# Patient Record
Sex: FEMALE | Race: BLACK OR AFRICAN AMERICAN | NOT HISPANIC OR LATINO | Employment: UNEMPLOYED | URBAN - METROPOLITAN AREA
[De-identification: names, ages, dates, MRNs, and addresses within clinical notes are randomized per-mention and may not be internally consistent; named-entity substitution may affect disease eponyms.]

---

## 2021-12-08 LAB — POC BETA-HCG (QUAL): NEGATIVE

## 2021-12-30 ENCOUNTER — HISTORICAL (OUTPATIENT)
Dept: INFUSION THERAPY | Facility: HOSPITAL | Age: 31
End: 2021-12-30

## 2022-02-24 ENCOUNTER — HISTORICAL (OUTPATIENT)
Dept: INFUSION THERAPY | Facility: HOSPITAL | Age: 32
End: 2022-02-24

## 2022-06-15 DIAGNOSIS — Z23 NEED FOR HPV VACCINATION: Primary | ICD-10-CM

## 2022-06-16 ENCOUNTER — INFUSION (OUTPATIENT)
Dept: INFUSION THERAPY | Facility: HOSPITAL | Age: 32
End: 2022-06-16
Attending: OBSTETRICS & GYNECOLOGY
Payer: MEDICAID

## 2022-06-16 VITALS
BODY MASS INDEX: 21.97 KG/M2 | OXYGEN SATURATION: 98 % | RESPIRATION RATE: 16 BRPM | SYSTOLIC BLOOD PRESSURE: 105 MMHG | DIASTOLIC BLOOD PRESSURE: 62 MMHG | HEART RATE: 80 BPM | TEMPERATURE: 98 F | WEIGHT: 124 LBS | HEIGHT: 63 IN

## 2022-06-16 DIAGNOSIS — Z23 NEED FOR HPV VACCINATION: ICD-10-CM

## 2022-06-16 DIAGNOSIS — Z23 NEED FOR HPV VACCINATION: Primary | ICD-10-CM

## 2022-06-16 PROCEDURE — 90471 IMMUNIZATION ADMIN: CPT | Performed by: OBSTETRICS & GYNECOLOGY

## 2022-06-16 PROCEDURE — 63600175 PHARM REV CODE 636 W HCPCS: Performed by: OBSTETRICS & GYNECOLOGY

## 2022-06-16 PROCEDURE — 96372 THER/PROPH/DIAG INJ SC/IM: CPT

## 2022-06-16 PROCEDURE — 90651 9VHPV VACCINE 2/3 DOSE IM: CPT | Performed by: OBSTETRICS & GYNECOLOGY

## 2022-06-16 RX ADMIN — HUMAN PAPILLOMAVIRUS 9-VALENT VACCINE, RECOMBINANT 0.5 ML: 30; 40; 60; 40; 20; 20; 20; 20; 20 INJECTION, SUSPENSION INTRAMUSCULAR at 09:06

## 2022-07-14 ENCOUNTER — HOSPITAL ENCOUNTER (EMERGENCY)
Facility: HOSPITAL | Age: 32
Discharge: HOME | End: 2022-07-14
Attending: EMERGENCY MEDICINE
Payer: MEDICAID

## 2022-07-14 VITALS
DIASTOLIC BLOOD PRESSURE: 85 MMHG | OXYGEN SATURATION: 99 % | SYSTOLIC BLOOD PRESSURE: 133 MMHG | RESPIRATION RATE: 16 BRPM | HEART RATE: 100 BPM

## 2022-07-14 DIAGNOSIS — G44.209 ACUTE NON INTRACTABLE TENSION-TYPE HEADACHE: Primary | ICD-10-CM

## 2022-07-14 DIAGNOSIS — T17.308A CHOKING: ICD-10-CM

## 2022-07-14 PROCEDURE — 99283 EMERGENCY DEPT VISIT LOW MDM: CPT | Mod: 25

## 2022-07-14 PROCEDURE — 25000003 PHARM REV CODE 250: Performed by: EMERGENCY MEDICINE

## 2022-07-14 RX ORDER — ACETAMINOPHEN 500 MG
1000 TABLET ORAL
Status: COMPLETED | OUTPATIENT
Start: 2022-07-14 | End: 2022-07-14

## 2022-07-14 RX ADMIN — ACETAMINOPHEN 1000 MG: 500 TABLET, FILM COATED ORAL at 07:07
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
## 2022-09-17 ENCOUNTER — HISTORICAL (OUTPATIENT)
Dept: ADMINISTRATIVE | Facility: HOSPITAL | Age: 32
End: 2022-09-17
Payer: MEDICAID

## 2022-11-18 ENCOUNTER — HOSPITAL ENCOUNTER (EMERGENCY)
Facility: HOSPITAL | Age: 32
Discharge: HOME | End: 2022-11-19
Attending: FAMILY MEDICINE
Payer: MEDICAID

## 2022-11-18 DIAGNOSIS — F41.9 ANXIETY: Primary | ICD-10-CM

## 2022-11-18 PROCEDURE — 25000003 PHARM REV CODE 250: Performed by: FAMILY MEDICINE

## 2022-11-18 PROCEDURE — 99283 EMERGENCY DEPT VISIT LOW MDM: CPT

## 2022-11-18 RX ORDER — HYDROXYZINE PAMOATE 25 MG/1
25 CAPSULE ORAL 4 TIMES DAILY
Qty: 12 CAPSULE | Refills: 0 | Status: SHIPPED | OUTPATIENT
Start: 2022-11-18 | End: 2022-11-21

## 2022-11-18 RX ORDER — HYDROXYZINE PAMOATE 25 MG/1
25 CAPSULE ORAL
Status: COMPLETED | OUTPATIENT
Start: 2022-11-18 | End: 2022-11-18

## 2022-11-18 RX ADMIN — HYDROXYZINE PAMOATE 25 MG: 25 CAPSULE ORAL at 11:11

## 2022-11-19 VITALS
DIASTOLIC BLOOD PRESSURE: 82 MMHG | HEART RATE: 106 BPM | TEMPERATURE: 97 F | RESPIRATION RATE: 20 BRPM | BODY MASS INDEX: 22.32 KG/M2 | HEIGHT: 63 IN | OXYGEN SATURATION: 100 % | WEIGHT: 126 LBS | SYSTOLIC BLOOD PRESSURE: 124 MMHG
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
## 2023-01-17 ENCOUNTER — LAB VISIT (OUTPATIENT)
Dept: LAB | Facility: HOSPITAL | Age: 33
End: 2023-01-17
Attending: OBSTETRICS & GYNECOLOGY
Payer: MEDICAID

## 2023-01-17 DIAGNOSIS — Z71.1 CONCERN ABOUT STD IN FEMALE WITHOUT DIAGNOSIS: ICD-10-CM

## 2023-01-17 PROCEDURE — 36415 COLL VENOUS BLD VENIPUNCTURE: CPT

## 2023-01-17 PROCEDURE — 80074 ACUTE HEPATITIS PANEL: CPT

## 2023-01-17 PROCEDURE — 86592 SYPHILIS TEST NON-TREP QUAL: CPT

## 2023-01-17 PROCEDURE — 87389 HIV-1 AG W/HIV-1&-2 AB AG IA: CPT

## 2023-01-17 PROCEDURE — 86780 TREPONEMA PALLIDUM: CPT

## 2023-01-18 LAB
HAV IGM SERPL QL IA: NONREACTIVE
HBV CORE IGM SERPL QL IA: NONREACTIVE
HBV SURFACE AG SERPL QL IA: NONREACTIVE
HCV AB SERPL QL IA: NONREACTIVE
HIV 1+2 AB+HIV1 P24 AG SERPL QL IA: NONREACTIVE
RPR SER QL: REACTIVE
RPR SER-TITR: ABNORMAL {TITER}
T PALLIDUM AB SER QL: REACTIVE

## 2023-01-20 LAB — PATH REV: NORMAL

## 2023-04-13 ENCOUNTER — HOSPITAL ENCOUNTER (EMERGENCY)
Facility: HOSPITAL | Age: 33
Discharge: HOME | End: 2023-04-14
Attending: STUDENT IN AN ORGANIZED HEALTH CARE EDUCATION/TRAINING PROGRAM
Payer: MEDICAID

## 2023-04-13 DIAGNOSIS — R11.0 NAUSEA: ICD-10-CM

## 2023-04-13 DIAGNOSIS — R10.30 LOWER ABDOMINAL PAIN: Primary | ICD-10-CM

## 2023-04-13 LAB
APPEARANCE UR: CLEAR
B-HCG SERPL QL: NEGATIVE
BACTERIA #/AREA URNS AUTO: ABNORMAL /HPF
BASOPHILS # BLD AUTO: 0.07 X10(3)/MCL (ref 0–0.2)
BASOPHILS NFR BLD AUTO: 1 %
BILIRUB UR QL STRIP.AUTO: NEGATIVE MG/DL
COLOR UR AUTO: YELLOW
EOSINOPHIL # BLD AUTO: 0.06 X10(3)/MCL (ref 0–0.9)
EOSINOPHIL NFR BLD AUTO: 0.9 %
ERYTHROCYTE [DISTWIDTH] IN BLOOD BY AUTOMATED COUNT: 12.8 % (ref 11.5–17)
GLUCOSE UR QL STRIP.AUTO: NEGATIVE MG/DL
HCT VFR BLD AUTO: 37.5 % (ref 37–47)
HGB BLD-MCNC: 12.5 G/DL (ref 12–16)
IMM GRANULOCYTES # BLD AUTO: 0.01 X10(3)/MCL (ref 0–0.04)
IMM GRANULOCYTES NFR BLD AUTO: 0.1 %
KETONES UR QL STRIP.AUTO: NEGATIVE MG/DL
LEUKOCYTE ESTERASE UR QL STRIP.AUTO: ABNORMAL UNIT/L
LYMPHOCYTES # BLD AUTO: 2.7 X10(3)/MCL (ref 0.6–4.6)
LYMPHOCYTES NFR BLD AUTO: 38.6 %
MCH RBC QN AUTO: 29.7 PG (ref 27–31)
MCHC RBC AUTO-ENTMCNC: 33.3 G/DL (ref 33–36)
MCV RBC AUTO: 89.1 FL (ref 80–94)
MONOCYTES # BLD AUTO: 0.63 X10(3)/MCL (ref 0.1–1.3)
MONOCYTES NFR BLD AUTO: 9 %
NEUTROPHILS # BLD AUTO: 3.52 X10(3)/MCL (ref 2.1–9.2)
NEUTROPHILS NFR BLD AUTO: 50.4 %
NITRITE UR QL STRIP.AUTO: NEGATIVE
NRBC BLD AUTO-RTO: 0 %
PH UR STRIP.AUTO: 7 [PH]
PLATELET # BLD AUTO: 238 X10(3)/MCL (ref 130–400)
PMV BLD AUTO: 10.3 FL (ref 7.4–10.4)
PROT UR QL STRIP.AUTO: NEGATIVE MG/DL
RBC # BLD AUTO: 4.21 X10(6)/MCL (ref 4.2–5.4)
RBC #/AREA URNS AUTO: ABNORMAL /HPF
RBC UR QL AUTO: ABNORMAL UNIT/L
SP GR UR STRIP.AUTO: 1.01
SQUAMOUS #/AREA URNS AUTO: ABNORMAL /HPF
UROBILINOGEN UR STRIP-ACNC: 0.2 MG/DL
WBC # SPEC AUTO: 7 X10(3)/MCL (ref 4.5–11.5)
WBC #/AREA URNS AUTO: ABNORMAL /HPF

## 2023-04-13 PROCEDURE — 80053 COMPREHEN METABOLIC PANEL: CPT | Performed by: STUDENT IN AN ORGANIZED HEALTH CARE EDUCATION/TRAINING PROGRAM

## 2023-04-13 PROCEDURE — 83690 ASSAY OF LIPASE: CPT | Performed by: STUDENT IN AN ORGANIZED HEALTH CARE EDUCATION/TRAINING PROGRAM

## 2023-04-13 PROCEDURE — 81025 URINE PREGNANCY TEST: CPT | Performed by: STUDENT IN AN ORGANIZED HEALTH CARE EDUCATION/TRAINING PROGRAM

## 2023-04-13 PROCEDURE — 63600175 PHARM REV CODE 636 W HCPCS: Performed by: STUDENT IN AN ORGANIZED HEALTH CARE EDUCATION/TRAINING PROGRAM

## 2023-04-13 PROCEDURE — 85025 COMPLETE CBC W/AUTO DIFF WBC: CPT | Performed by: STUDENT IN AN ORGANIZED HEALTH CARE EDUCATION/TRAINING PROGRAM

## 2023-04-13 PROCEDURE — 81001 URINALYSIS AUTO W/SCOPE: CPT | Performed by: STUDENT IN AN ORGANIZED HEALTH CARE EDUCATION/TRAINING PROGRAM

## 2023-04-13 PROCEDURE — 83735 ASSAY OF MAGNESIUM: CPT | Performed by: STUDENT IN AN ORGANIZED HEALTH CARE EDUCATION/TRAINING PROGRAM

## 2023-04-13 PROCEDURE — 25000003 PHARM REV CODE 250: Performed by: STUDENT IN AN ORGANIZED HEALTH CARE EDUCATION/TRAINING PROGRAM

## 2023-04-13 PROCEDURE — 99284 EMERGENCY DEPT VISIT MOD MDM: CPT | Mod: 25

## 2023-04-13 PROCEDURE — 96360 HYDRATION IV INFUSION INIT: CPT

## 2023-04-13 RX ORDER — ONDANSETRON 4 MG/1
4 TABLET, ORALLY DISINTEGRATING ORAL
Status: COMPLETED | OUTPATIENT
Start: 2023-04-13 | End: 2023-04-13

## 2023-04-13 RX ADMIN — ONDANSETRON 4 MG: 4 TABLET, ORALLY DISINTEGRATING ORAL at 10:04

## 2023-04-13 RX ADMIN — SODIUM CHLORIDE, POTASSIUM CHLORIDE, SODIUM LACTATE AND CALCIUM CHLORIDE 1000 ML: 600; 310; 30; 20 INJECTION, SOLUTION INTRAVENOUS at 11:04

## 2023-04-14 VITALS
RESPIRATION RATE: 18 BRPM | WEIGHT: 129 LBS | SYSTOLIC BLOOD PRESSURE: 129 MMHG | BODY MASS INDEX: 22.86 KG/M2 | HEIGHT: 63 IN | HEART RATE: 73 BPM | TEMPERATURE: 98 F | OXYGEN SATURATION: 98 % | DIASTOLIC BLOOD PRESSURE: 78 MMHG

## 2023-04-14 LAB
ALBUMIN SERPL-MCNC: 3.5 G/DL (ref 3.5–5)
ALBUMIN/GLOB SERPL: 1.2 RATIO (ref 1.1–2)
ALP SERPL-CCNC: 71 UNIT/L (ref 40–150)
ALT SERPL-CCNC: 9 UNIT/L (ref 0–55)
AST SERPL-CCNC: 11 UNIT/L (ref 5–34)
BILIRUBIN DIRECT+TOT PNL SERPL-MCNC: 0.3 MG/DL
BUN SERPL-MCNC: 9.3 MG/DL (ref 7–18.7)
CALCIUM SERPL-MCNC: 9 MG/DL (ref 8.4–10.2)
CHLORIDE SERPL-SCNC: 109 MMOL/L (ref 98–107)
CO2 SERPL-SCNC: 24 MMOL/L (ref 22–29)
CREAT SERPL-MCNC: 0.71 MG/DL (ref 0.55–1.02)
GFR SERPLBLD CREATININE-BSD FMLA CKD-EPI: >60 MLS/MIN/1.73/M2
GLOBULIN SER-MCNC: 3 GM/DL (ref 2.4–3.5)
GLUCOSE SERPL-MCNC: 88 MG/DL (ref 74–100)
LIPASE SERPL-CCNC: 37 U/L
MAGNESIUM SERPL-MCNC: 1.9 MG/DL (ref 1.6–2.6)
POTASSIUM SERPL-SCNC: 3.8 MMOL/L (ref 3.5–5.1)
PROT SERPL-MCNC: 6.5 GM/DL (ref 6.4–8.3)
SODIUM SERPL-SCNC: 140 MMOL/L (ref 136–145)

## 2023-04-14 RX ORDER — ONDANSETRON 4 MG/1
4 TABLET, FILM COATED ORAL EVERY 6 HOURS
Qty: 12 TABLET | Refills: 0 | Status: SHIPPED | OUTPATIENT
Start: 2023-04-14 | End: 2023-05-09

## 2023-04-14 NOTE — ED PROVIDER NOTES
"Encounter Date: 4/13/2023       History     Chief Complaint   Patient presents with    Abdominal Pain     " Stomach pain left side vomited once."     HPI    33-year-old female with no known past medical history presents emergency department with left lower quadrant abdominal pain.  Patient states this started this morning.  States it fluctuates.  Had episode of vomiting earlier.  No longer having any nausea.  Denies any constipation diarrhea. no burning on urination.    Review of patient's allergies indicates:   Allergen Reactions    Ibuprofen Hives     No past medical history on file.  Past Surgical History:   Procedure Laterality Date    TUBAL LIGATION       No family history on file.  Social History     Tobacco Use    Smoking status: Never     Passive exposure: Never    Smokeless tobacco: Never     Review of Systems   Constitutional:  Negative for fever.   Respiratory:  Negative for cough and shortness of breath.    Cardiovascular:  Negative for chest pain.   Gastrointestinal:  Positive for abdominal pain, nausea and vomiting. Negative for constipation and diarrhea.   Genitourinary:  Negative for dysuria.   All other systems reviewed and are negative.    Physical Exam     Initial Vitals [04/13/23 2216]   BP Pulse Resp Temp SpO2   122/75 89 20 98.3 °F (36.8 °C) 98 %      MAP       --         Physical Exam    Nursing note and vitals reviewed.  Constitutional: She appears well-developed and well-nourished. No distress.   Cardiovascular:  Normal rate and regular rhythm.           Pulmonary/Chest: Breath sounds normal. No respiratory distress.   Abdominal: Abdomen is soft. There is abdominal tenderness (mild tenderness to the left lower quadrant). There is guarding. There is no rebound.   Musculoskeletal:         General: No tenderness. Normal range of motion.     Neurological: She is alert and oriented to person, place, and time.   Skin: Skin is warm. Capillary refill takes less than 2 seconds.   Psychiatric: She has " a normal mood and affect. Thought content normal.       ED Course   Procedures  Labs Reviewed   URINALYSIS - Abnormal; Notable for the following components:       Result Value    Blood, UA Trace (*)     Leukocyte Esterase, UA Small (*)     All other components within normal limits   COMPREHENSIVE METABOLIC PANEL - Abnormal; Notable for the following components:    Chloride 109 (*)     All other components within normal limits   URINALYSIS, MICROSCOPIC - Abnormal; Notable for the following components:    WBC, UA 6-10 (*)     Squamous Epithelial Cells, UA Few (*)     All other components within normal limits   PREGNANCY TEST, URINE RAPID - Normal   LIPASE - Normal   MAGNESIUM - Normal   CBC W/ AUTO DIFFERENTIAL    Narrative:     The following orders were created for panel order CBC auto differential.  Procedure                               Abnormality         Status                     ---------                               -----------         ------                     CBC with Differential[[id number]]                            Final result                 Please view results for these tests on the individual orders.   CBC WITH DIFFERENTIAL          Imaging Results    None          Medications   lactated ringers bolus 1,000 mL (1,000 mLs Intravenous New Bag 4/13/23 3430)   ondansetron disintegrating tablet 4 mg (4 mg Oral Given 4/13/23 0109)     Medical Decision Making:   Differential Diagnosis:   Abdominal pain, nausea vomiting, diverticulitis, ovarian cyst, UTI   Medical Decision Making  Problems Addressed:  Lower abdominal pain: undiagnosed new problem with uncertain prognosis  Nausea: self-limited or minor problem    Amount and/or Complexity of Data Reviewed  Labs: ordered. Decision-making details documented in ED Course.    Risk  OTC drugs.  Prescription drug management.  Risk Details: Offered CT the patient.  Patient denied.  This is reasonable.              ED Course as of 04/14/23 0013   Thu Apr 13, 2023    2255 Preg Test, Ur: Negative [BS]   Fri Apr 14, 2023   0009 Magnesium: 1.90 [BS]   0009 Sodium: 140 [BS]   0009 Potassium: 3.8 [BS]   0009 Chloride(!): 109 [BS]   0009 CO2: 24 [BS]   0009 Glucose: 88 [BS]   0009 Creatinine: 0.71 [BS]   0009 BUN: 9.3 [BS]   0009 Bacteria, UA: None Seen [BS]   0009 WBC: 7.0 [BS]   0009 Hematocrit: 37.5 [BS]   0009 Hemoglobin: 12.5 [BS]   0009 Platelets: 238  Patient resting comfortably bed in no acute distress vital signs stable.  Patient states she feels much better with the fluids.  Informed her that all labs are within normal limits and that we could get a CT of she is very concerned.  Patient states that since her abdominal pain is improving she just wants to be discharged.  She states that she will return if anything worsens.  This is completely reasonable and justified.  That will give her some Zofran have if she becomes nauseated patient agrees with this.  ER precautions given. [BS]      ED Course User Index  [BS] Gunnar Cunningham MD                 Clinical Impression:   Final diagnoses:  [R10.30] Lower abdominal pain (Primary)  [R11.0] Nausea        ED Disposition Condition    Discharge Stable          ED Prescriptions       Medication Sig Dispense Start Date End Date Auth. Provider    ondansetron (ZOFRAN) 4 MG tablet Take 1 tablet (4 mg total) by mouth every 6 (six) hours. 12 tablet 4/14/2023 -- Gunnar Cunningham MD          Follow-up Information       Follow up With Specialties Details Why Contact Info    [facility] - Emergency Dept Emergency Medicine Go to  If symptoms worsen [address]  [facility] [id number] [phone]             Gunnar Cunningham MD  04/14/23 0013

## 2023-04-26 ENCOUNTER — PATIENT OUTREACH (OUTPATIENT)
Dept: EMERGENCY MEDICINE | Facility: HOSPITAL | Age: 33
End: 2023-04-26
Payer: MEDICAID

## 2023-04-26 NOTE — PATIENT INSTRUCTIONS
Please give me a call if you need anything in the future.    Thank you,    Nisa, Nurse Navigator    [facility]    [phone]     Why Should I Have My Own Doctor or Nurse Practitioner (PCP) to Take Care of Me  What is a PCP (Primary Care Provider)?    A primary care provider is a doctor or nurse practitioner who you can call for an appointment and will see you when you are sick.    You will also be seen at scheduled appointment times during the year to check on your diabetes, or high blood pressure, or heart disease.    Why see the same PCP (doctor/nurse practitioner)?    You can be seen faster when you are sick           You, the PCP (doctor/nurse practitioner) and the office staff get to know each other; you begin to trust them to care for you. You take part in your health choices.   All of you together are a team.    Your medicine is looked at every time you visit, to be sure you are taking the medicine, as the PCP (doctor/nurse practitioner) ordered.    Your PCP (doctor/nurse practitioner) and their staff help keep you healthy and out of the hospital.  They can catch sicknesses earlier by ordering tests once a year to stop or prevent the sickness from getting worse.      Your PCP (doctor/nurse practitioner) can send you to providers who specialize (heart/bone/lung) if you need.  They and their office staff help keep track of your seeing other providers (doctors/nurse practitioners) and tests (CT/ MRIs/ X Rays)) taken.        PCPs want you to stay healthy.  Let us care for you.             Why is taking care of your mouth/teeth/gums important?     Your mouth is the opening to your body.  If not kept clean, it can let in sickness to the rest of your body.     Oral Health Care (Dentists)                 City:  Provider Address Phone Number Insurance Plan   [place]:  None available                    Toño Rojas, S [address] [place] [address] LENNOXToño VILLALOBOS [phone]   ADULTS ONLY Medicaid:   HB & AETNA ONLY             [place]         Dentures and Dental Service ([facility]) [address] [phone]  DENTURES ONLY ADULT Medicaid:  HB & AETNA ONLY             [facility] [address] [facility] [phone] All Medicaid/Medicare             Lea Hameed, DDS 1600  [facility] Lea Winkler [phone] Medicaid Children only 2 to 21             [facility] 104 [facility] [phone] Accepts:   [facility], HB, Aetna         [facility] [address] [facility] [facility] [address], [place] [phone] Medicare             [facility] [address], [place] [phone] Medicaid Children only 2 to 21             [facility] [address], [place] [phone] Medicaid Children only 2 to 21             [facility] [facility]  [address], [place] [phone] Medicaid Children only 5 yrs and younger:  lip/tongue tie             [facility] 13[address] [facility] [phone] Medicaid Children only 2 to 21          [facility]  [address] [facility]  [phone]           [facility] 1004 [facility] [phone] All Medicaid/Medicare :  ADULTS             [facility] 500 [facility] [phone] All Medicaid/Medicare :  ADULTS             [facility] [address]  [facility] [phone] Medicaid Children only 2 to 21             [facility]  [address] [place] [phone] Medicaid Children only 2 to 21             Dr. Jenn Umanzor,  [facility] [phone] Medicare for Dentures Only             [facility], [facility] 8762 , [place] [phone] All Medicaid/Medicare :   EXCEPT [facility]; ADULTS         Juma Perkins [address] [facility]  "[phone] Accepts:   LHC, HB, Aetna             [address] [phone]  C, [facility], HB, Aetna/Medicare :  [facility]; [place]: [phone]  [facility]; [place]: [phone]       [facility] Medicaid Eye Clinics      [facility]  [address] [place] [address]  Phone: [phone]  (Accepts all Medicaid for visit and glasses)     [facility]  [address] [place] [address]  Phone: [phone]  (Only [facility] Medicaid for visit)  (All plans for glasses)     [facility]   [address] [place] [address]  Phone: [phone]  (All Medicaid plans for visit and glasses)  (Only 1 doctor that comes twice a month)        [facility]  [address] [place] [address]  Phone: [phone]  (No Medicaid for visit)  (Aetna, [facility], [facility] Healthy Blue for glasses)    [facility]  [address] [address]  phone: [phone]  (Accepts all Medicaid)                 [facility]  [address] [place]. [address]  phone: [phone]  (Accepts [facility], [facility] and [facility] Medicaid)     [facility]  [address] [place] [address]; [address]  phone" [phone]  (Accepts all Medicaid)           Feeling lonely?    Short periods of feeling lonely can occur to many people at some time in their life.  However, when feelings of loneliness and isolation do not go away, these symptoms can place you at greater risk for more serious health problems, including:      Depression  Not sleeping      Heart Disease      High blood pressure  Drug/alcohol abuse      What can you do?          Talk to your doctor; let someone know what is going on    Try to be with people:  volunteer, get a hobby  Exercise outside with others in the sunlight  Places to call to get together with " others:  [employer]-- Help to volunteer in our community-   Phone: [phone];  [address], [place] [address]  JAIR [place] [facility] [address] [place] [place] [address] [phone]  --PACE and [facility] waiver program--for elderly who would benefit from meeting others:      [employer]--for social support for those aging out of the foster care system: [phone];  Email: connect@[facility] [place].org  [employer]--for young people with disabilities to participate in sports and other social activities ;   [address] [place] [address] [place] [address] [phone]  [facility]: [phone]  [facility]: [phone]  [place] on [phone]             How does drug/alcohol abuse affect your health?          Drug and alcohol abuse can cause your breathing to slow down enough for you to stop breathing. It can cause you to see things that are not there. Keep you from sleeping for days.  Continued use of drugs and alcohol will weaken your heart, liver, and kidneys.        279 is the three-digit nationwide phone number to connect directly to crisis lifeline.  BY calling or texting 356, you will connect with mental health professions.  Veterans can press 1 after dialing 505 to connect directly to Veterans Crisis lifeline.      Mental Health/Substance Abuse  [place] [facility]:  [phone]  [facility]: [phone]  [facility] Human Services: [phone]    [facility] Developmental Disabilities  [address] [place] [address]  Phone: [phone]  Fax:  [phone]    [facility]  [address] [place]  [address]  Phone:  [phone]  Fax:  [phone]          [facility]  [address] [place]  [address]  Phone:  [phone]  Fax:  [phone]    [facility]  [address] [place]  [address]  Phone:  [phone]  Fax:  [phone]    [facility]  [address] [place] [address]  Phone: [phone]  Fax:  [phone]    [facility]  [address] [place]  [address]  Phone:  [phone]  Fax: [phone]    [facility]  [address]        [place], [place]  Phone:  [phone]    [facility]       [address]        [place], [place]  [id number] [phone]      [facility]   [address] [place] [address]   Phone: [phone]   Medicaid Accepted Plans  Healthy Blue   [facility] on hold at present                                                      Medicaid Accepted Plans    [facility] Healthy Blue   [address] [place] [address] [facility]   Phone:[phone]     Healthy Blue   [address] [place] [address] [facility]    Phone:[phone]      [facility] Healthy Blue   [address] Aetna   [address] [place] [address] [facility]   Phone:  [phone]      [facility] Aetna   [address] [facility] [place] [address]    Phone: [phone]      [facility] All   [address] [place] [address]    Phone: [phone]        [facility] All   [address] [place] [address]    Phone: [phone]         All     [address] [place] [address]    Phone: [phone]     All   [address] [place] [address]    Phone: [phone]        [facility] All   [address] [place] [address]    Phone: [phone]        [facility] All   [address]    [place] [address]    Phone: [phone]        [facility] All   [address] [facility]   [address]    [place] [address]    Phone: [phone]        [facility] All   [address] [facility]    [place] [address]    Phone: [phone]        [address] [place] [address]    Phone: [phone]            [facility] All   [address] [facility] [place] [address]    Phone: [phone]              [employer]       Aetna   [address] Healthy Blue   [address] [place] [address]    Phone: [phone]        [address]   [place] [address] Healthy Blue   Phone: [phone] [facility]       [address] [facility]   [address] [place] [address] [facility]   Phone: [phone]        Rehab Services All   [address] [facility]    [place] [address]    Phone: [phone]        [address] [facility] [place] [address]    [phone]        [address] [facility] [place] [address]    Phone: [phone]        [facility] All   [address]    [address]    [place] [address]    Phone: [phone]    Counseling only can be self-referral        [address] [facility] [place] [address]    [phone]              [address] [facility] [place] [address]    Phone: [phone]          [facility]   [facility]   [address] [facility] Healthy Blue   [place] [address] [facility]   Phone: [phone]            [facility]     All   [address]    [address]    [place] [address]    Phone: [phone]    Grace Lundberg/Ellyn Threats      [employer] Medicaid  Substance Abuse Rehabilitation Facility,   [facility]  [facility]  [phone]             Budget-Friendly Healthy Eating    Save money at the grocery store and eat healthy.   Buy groceries keeping your budget in mind  Make a grocery list and only buy what you have on the list  Eat food  "you cook or have at home; limit fast food or eating out    Compare food labels  Look at store brand food labels and compare to brand names, often food value is the same and the store brand is cheaper      Look for products that do not have sugar, fat, or salt (sodium) added.  These often cost the same but are healthier for you.  They may be labeled as:  ?Sugar-free.  ?Nonfat.              ?Low-fat.  ?Sodium-free.  ?Low sodium.  Look for lean ground beef labeled as at least 92% lean and 8% fat.        Shopping    Buy only the items on your grocery list and go only to the areas of the store that have the items on your list.  Use coupons only for foods and brands you normally buy. Avoid buying items you wouldn't normally buy simply because they are on sale.  Check online and in newspapers for weekly deals.  Buy healthy items from the bulk bins when available, such as herbs, spices, flour, pasta, nuts, and dried fruit.  Buy fruits and vegetables that are in season. Prices are usually lower on in-season produce.  Look at the unit price on the price tag. Use it to compare different brands and sizes to find out which item is the best deal.  Choose healthy items that are often low-cost, such as carrots, potatoes, apples, bananas, and oranges. Dried or canned beans are a low-cost protein source.      Buy in bulk and freeze extra food. Items you can buy in bulk include meats, fish, poultry, frozen fruits, and frozen vegetables.  Avoid buying "ready-to-eat" foods, such as pre-cut fruits and vegetables and pre-made salads.  If possible, shop around to discover where you can find the best prices. Consider other retailers such as dollar stores, larger wholesale stores, local fruit and vegetable stands, and [employer] markets.  Do not shop when you are hungry. If you shop while hungry, it may be hard to stick to your list and budget.      Resist impulse buying. Use your grocery list as your official plan for the week.      Buy a " "variety of vegetables and fruits by purchasing fresh, frozen, and canned items.  Look at the top and bottom shelves for deals. Foods at eye level (eye level of an adult or child) are usually more expensive.  Be efficient with your time when shopping. The more time you spend at the store, the more money you are likely to spend.  To save money when choosing more expensive foods like meats and dairy:  ?Choose cheaper cuts of meat, such as bone-in chicken thighs and drumsticks instead of skinless and boneless chicken. When you are ready to prepare the chicken, you can remove the skin yourself to make it healthier.  ?Choose lean meats like chicken or turkey instead of beef.  ?Choose canned seafood, such as tuna, salmon, or sardines.  ?Buy eggs as a low-cost source of protein.  ?Buy dried beans and peas, such as lentils, split peas, or kidney beans instead of meats. Dried beans and peas are a good alternative source of protein.  ?Buy the larger tubs of yogurt instead of individual-sized containers.                        Choose water instead of sodas and other sweetened beverages.  Avoid buying chips, cookies, and other "junk food." These items are usually expensive and not healthy.  Meal planning  Do not eat out or get fast food. Prepare food at home.  Make a grocery list and make sure to bring it with you to the store.   Plan meals and snacks according to a grocery list and budget you create.  Use leftovers in your meal plan for the week.  Look for recipes where you can cook once and make enough food for two meals.  Include budget-friendly meals like stews, casseroles, and stir-milian dishes.  Try some meatless meals or try "no cook" meals like salads.  Make sure that half your plate is filled with fruits or vegetables. Choose from fresh, frozen, or canned fruits and vegetables. If eating canned, remember to rinse them before eating. This will remove any excess salt added for packaging.            Summary  Eating healthy " on a budget is possible if you plan your meals according to your budget, buy according to your budget and grocery list, and prepare food yourself.   Tips for buying more food on a limited budget include buying generic brands, using coupons only for foods you normally buy, and buying healthy items from the bulk bins when available.  Tips for buying cheaper food to replace expensive food include choosing cheaper, lean cuts of meat, and buying dried beans and peas.    Discuss any question you have with your doctor.    07/06/2022 custom

## 2023-04-26 NOTE — PROGRESS NOTES
Spoke to pt for post ed visit and initial MCIP. Pt verbal consent to enroll in MCIP and continue f/u calls. Pt reports relief from ED visit. Advised pt to follow discharge instructions. Discussed medication and budget friendly healthy eating compliance, benefits of pcp, oral/vision care, ED utilization and to utilize UCC for non emergency issues until est pcp and when pcp is not available and stressed importance of est pcp and f/u care with pcp and mental health provider. Pcp/Mental Health provider options discussed with pt. Pt request assistance with obtaining pcp appt with [facility]. Pt reports she was seeing a mental health provider in [place], but no longer seeing and does not have a mental health provider. Pt denies si/hi and mental health crisis.   Called [facility] to obtain PCP 5/9/23 at 10:30 am.   Advised pt to discuss mental health at new pcp appt.   Offered pt to mail avs with [facility] education/resource discussed, pt agreed to mail this information, verified pt address with pt.  Appointment:  5/2/23 at 9:30 am with Keven Zamudio MD (gyn)  5/9/23 at 10:30 am for new pt pcp appt with [facility]    Providers:  Keven Zamudio MD (gyn)  Bernardo Luo NP(mental health)

## 2023-05-01 ENCOUNTER — PATIENT OUTREACH (OUTPATIENT)
Dept: EMERGENCY MEDICINE | Facility: HOSPITAL | Age: 33
End: 2023-05-01
Payer: MEDICAID

## 2023-05-01 NOTE — PROGRESS NOTES
Spoke to pt and remind of appt on tomorrow 5/2/23 at 9:30 am with Dr. Keven Zamudio,gyn, stressed importance of attending appt. Pt voices understanding to instructions given and appreciation.

## 2023-05-08 ENCOUNTER — PATIENT OUTREACH (OUTPATIENT)
Dept: EMERGENCY MEDICINE | Facility: HOSPITAL | Age: 33
End: 2023-05-08
Payer: MEDICAID

## 2023-05-08 NOTE — PROGRESS NOTES
Spoke to pt for f/u. Applaud pt on attending appt with gyn on last week. Pt reports that over there weekend she was dizzy and vomit. Pt reports symptoms are better today. Encouraged pt to visit ucc/ed for worsening symptoms. Discussed medication and budget friendly health eating compliance, benefits of pcp and upcoming appts, ED utilization and to utilize UCC for non emergency issues when pcp is not available and stressed importance of est pcp and f/u care with pcp and all providers and ancillary care. Pt denies si/hi and mental health crisis and to discuss mental health condition with new pcp. Pt reports she did receive mail sent to her on [facility] education/resources. Pt voices understanding to instructions given and appreciation.   Appointment:  Nv- 7/28/23 at 9 am for depo lv-5/2/23  with Keven Zamudio MD (gyn)  5/9/23 at 10:30 am [facility]     Providers:  Keven Zamudio MD (gyn)  Bernardo Luo NP(mental health)

## 2023-05-08 NOTE — PROGRESS NOTES
Spoke to pt and remind of appt for tomorrow 5/9/23 at 10:30 am, stressed importance of attending appt to est pcp and benefits of pcp. Pt voices understanding to instructions given and appreciation.

## 2023-05-09 ENCOUNTER — OFFICE VISIT (OUTPATIENT)
Dept: FAMILY MEDICINE | Facility: CLINIC | Age: 33
End: 2023-05-09
Payer: MEDICAID

## 2023-05-09 VITALS
RESPIRATION RATE: 18 BRPM | HEIGHT: 63 IN | OXYGEN SATURATION: 98 % | BODY MASS INDEX: 23.53 KG/M2 | SYSTOLIC BLOOD PRESSURE: 111 MMHG | DIASTOLIC BLOOD PRESSURE: 74 MMHG | WEIGHT: 132.81 LBS | HEART RATE: 78 BPM | TEMPERATURE: 98 F

## 2023-05-09 DIAGNOSIS — F32.A DEPRESSION, UNSPECIFIED DEPRESSION TYPE: Primary | ICD-10-CM

## 2023-05-09 DIAGNOSIS — Z76.89 ENCOUNTER TO ESTABLISH CARE: ICD-10-CM

## 2023-05-09 PROCEDURE — 3074F PR MOST RECENT SYSTOLIC BLOOD PRESSURE < 130 MM HG: ICD-10-PCS | Mod: CPTII,,, | Performed by: NURSE PRACTITIONER

## 2023-05-09 PROCEDURE — 1159F MED LIST DOCD IN RCRD: CPT | Mod: CPTII,,, | Performed by: NURSE PRACTITIONER

## 2023-05-09 PROCEDURE — 99203 PR OFFICE/OUTPT VISIT, NEW, LEVL III, 30-44 MIN: ICD-10-PCS | Mod: ,,, | Performed by: NURSE PRACTITIONER

## 2023-05-09 PROCEDURE — 3008F PR BODY MASS INDEX (BMI) DOCUMENTED: ICD-10-PCS | Mod: CPTII,,, | Performed by: NURSE PRACTITIONER

## 2023-05-09 PROCEDURE — 3078F PR MOST RECENT DIASTOLIC BLOOD PRESSURE < 80 MM HG: ICD-10-PCS | Mod: CPTII,,, | Performed by: NURSE PRACTITIONER

## 2023-05-09 PROCEDURE — 3008F BODY MASS INDEX DOCD: CPT | Mod: CPTII,,, | Performed by: NURSE PRACTITIONER

## 2023-05-09 PROCEDURE — 99203 OFFICE O/P NEW LOW 30 MIN: CPT | Mod: ,,, | Performed by: NURSE PRACTITIONER

## 2023-05-09 PROCEDURE — 1159F PR MEDICATION LIST DOCUMENTED IN MEDICAL RECORD: ICD-10-PCS | Mod: CPTII,,, | Performed by: NURSE PRACTITIONER

## 2023-05-09 PROCEDURE — 3078F DIAST BP <80 MM HG: CPT | Mod: CPTII,,, | Performed by: NURSE PRACTITIONER

## 2023-05-09 PROCEDURE — 3074F SYST BP LT 130 MM HG: CPT | Mod: CPTII,,, | Performed by: NURSE PRACTITIONER

## 2023-05-09 RX ORDER — FLUOXETINE 10 MG/1
10 CAPSULE ORAL DAILY
Qty: 30 CAPSULE | Refills: 1 | Status: SHIPPED | OUTPATIENT
Start: 2023-05-09 | End: 2023-05-31 | Stop reason: SDUPTHER

## 2023-05-09 NOTE — PROGRESS NOTES
"SUBJECTIVE:     History of Present Illness      Chief Complaint: Establish Care (New pt . C/o headache)    HPI:  Patient is a 33 y.o. year old female who presents to clinic to establish care as a new patient.  Patient states that she is having some anxiety and causes her to have headaches.    She denies SI, HI.      Review of Systems:    Review of Systems    12 point review of systems conducted, negative except as stated in the history of present illness. See HPI for details.     Previous History      Review of patient's allergies indicates:   Allergen Reactions    Ibuprofen Hives       History reviewed. No pertinent past medical history.  Current Outpatient Medications   Medication Instructions    FLUoxetine 10 mg, Oral, Daily    medroxyPROGESTERone (DEPO-PROVERA) 150 mg, Intramuscular, Every 3 months    metroNIDAZOLE (FLAGYL) 500 MG tablet Take 4 pills at one time on day one of treatment then take 4 pills at one time on day two of treatment.     Past Surgical History:   Procedure Laterality Date    TUBAL LIGATION       Family History   Problem Relation Age of Onset    Hypertension Mother     Hypertension Father        Social History     Tobacco Use    Smoking status: Never     Passive exposure: Never    Smokeless tobacco: Never   Substance Use Topics    Alcohol use: Not Currently    Drug use: Never        Health Maintenance      Health Maintenance   Topic Date Due    Lipid Panel  Never done    TETANUS VACCINE  Never done    Hepatitis C Screening  Completed       OBJECTIVE:     Physical Exam      Vital Signs Reviewed   Visit Vitals  /74 (BP Location: Right arm)   Pulse 78   Temp 97.8 °F (36.6 °C)   Resp 18   Ht 5' 3" (1.6 m)   Wt 60.2 kg (132 lb 12.8 oz)   LMP 04/20/2023 (Approximate)   SpO2 98%   BMI 23.52 kg/m²       Physical Exam    Physical Exam:  General: Alert, well nourished, no acute distress, non-toxic appearing.   Eyes: Anicteric sclera, without conjunctival injection, normal lids, no purulent " drainage, EOMs grossly intact.   Ears: No tragal tenderness. Tympanic membranes intact, pearly grey, without effusion or erythema and with a positive light reflex.   Mouth: Posterior pharynx without erythema. No exudate, ulcerations, or lesion. No tonsillar swelling.   Neck: Supple, full ROM, no rigidity, no cervical adenopathy.   Cardio: Normal rate and rhythm    Resp: Respirations even and unlabored, clear to auscultation bilaterally.   Abd: No ecchymosis or distension. Normal bowel sounds in all 4 quadrants. No tenderness to palpation. No rebound tenderness or guarding. No CVA tenderness.   Skin: No rashes or open lesions noted.   MSK: No swelling. No abrasions or signs of trauma. Ambulating without assistance.   Neuro: Alert,oriented No focal deficits noted. Facial expressions even.   Psych: Cooperative, Normal affect      Procedures    Procedures     Labs     Results for orders placed or performed in visit on 05/02/23   POCT urine pregnancy   Result Value Ref Range    POC Preg Test, Ur Negative Negative     Acceptable Yes        Chemistry:  Lab Results   Component Value Date     04/13/2023    K 3.8 04/13/2023    CHLORIDE 109 (H) 04/13/2023    BUN 9.3 04/13/2023    CREATININE 0.71 04/13/2023    EGFRNORACEVR >60 04/13/2023    GLUCOSE 88 04/13/2023    CALCIUM 9.0 04/13/2023    ALKPHOS 71 04/13/2023    LABPROT 6.5 04/13/2023    ALBUMIN 3.5 04/13/2023    BILIDIR 0.00 08/01/2018    IBILI 0.10 08/01/2018    AST 11 04/13/2023    ALT 9 04/13/2023    MG 1.90 04/13/2023        No results found for: HGBA1C, MICROALBCREA     Hematology:  Lab Results   Component Value Date    WBC 7.0 04/13/2023    HGB 12.5 04/13/2023    HCT 37.5 04/13/2023     04/13/2023       Lipid Panel:  No results found for: CHOL, HDL, LDL, TRIG, TOTALCHOLEST     Urine:  Lab Results   Component Value Date    COLORUA Yellow 04/13/2023    APPEARANCEUA Clear 04/13/2023    SGUA 1.010 04/13/2023    PHUA 7.0 04/13/2023    PROTEINUA  Negative 04/13/2023    GLUCOSEUA Negative 04/13/2023    KETONESUA Negative 04/13/2023    BLOODUA Trace (A) 04/13/2023    NITRITESUA Negative 04/13/2023    LEUKOCYTESUR Small (A) 04/13/2023    RBCUA 0-2 04/13/2023    WBCUA 6-10 (A) 04/13/2023    BACTERIA None Seen 04/13/2023         Assessment       1. Depression, unspecified depression type    2. Encounter to establish care           Plan       1. Depression, unspecified depression type  - FLUoxetine 10 MG capsule; Take 1 capsule (10 mg total) by mouth once daily.  Dispense: 30 capsule; Refill: 1    2. Encounter to establish care  - CBC Auto Differential; Future  - Comprehensive Metabolic Panel; Future  - Lipid Panel; Future  - TSH; Future  - Hemoglobin A1C; Future  - Urinalysis; Future  - Vitamin D; Future  - Urinalysis    Orders Placed This Encounter    CBC Auto Differential    Comprehensive Metabolic Panel    Lipid Panel    TSH    Hemoglobin A1C    Urinalysis    Vitamin D    FLUoxetine 10 MG capsule      Medication List with Changes/Refills   New Medications    FLUOXETINE 10 MG CAPSULE    Take 1 capsule (10 mg total) by mouth once daily.    METRONIDAZOLE (FLAGYL) 500 MG TABLET    Take 4 pills at one time on day one of treatment then take 4 pills at one time on day two of treatment.   Current Medications    MEDROXYPROGESTERONE (DEPO-PROVERA) 150 MG/ML SYRG    Inject 1 mL (150 mg total) into the muscle every 3 (three) months.   Discontinued Medications    ONDANSETRON (ZOFRAN) 4 MG TABLET    Take 1 tablet (4 mg total) by mouth every 6 (six) hours.       Follow up in about 3 weeks (around 5/30/2023) for Wellness.     Future Appointments   Date Time Provider Department Center   5/31/2023  9:15 AM KALPANA Bingham [facility]   6/7/2023  2:00 PM MD MANUELA [facility]   7/28/2023  9:00 AM MD [facility]EL[facility]

## 2023-05-29 ENCOUNTER — LAB VISIT (OUTPATIENT)
Dept: LAB | Facility: HOSPITAL | Age: 33
End: 2023-05-29
Attending: NURSE PRACTITIONER
Payer: MEDICAID

## 2023-05-29 DIAGNOSIS — Z76.89 ENCOUNTER TO ESTABLISH CARE: ICD-10-CM

## 2023-05-29 LAB
ALBUMIN SERPL-MCNC: 3.7 G/DL (ref 3.5–5)
ALBUMIN/GLOB SERPL: 1.1 RATIO (ref 1.1–2)
ALP SERPL-CCNC: 76 UNIT/L (ref 40–150)
ALT SERPL-CCNC: 8 UNIT/L (ref 0–55)
APPEARANCE UR: CLEAR
AST SERPL-CCNC: 13 UNIT/L (ref 5–34)
BACTERIA #/AREA URNS AUTO: ABNORMAL /HPF
BASOPHILS # BLD AUTO: 0.02 X10(3)/MCL
BASOPHILS NFR BLD AUTO: 0.4 %
BILIRUB UR QL STRIP.AUTO: NEGATIVE MG/DL
BILIRUBIN DIRECT+TOT PNL SERPL-MCNC: 0.5 MG/DL
BUN SERPL-MCNC: 12.8 MG/DL (ref 7–18.7)
CALCIUM SERPL-MCNC: 8.9 MG/DL (ref 8.4–10.2)
CHLORIDE SERPL-SCNC: 112 MMOL/L (ref 98–107)
CHOLEST SERPL-MCNC: 171 MG/DL
CHOLEST/HDLC SERPL: 4 {RATIO} (ref 0–5)
CO2 SERPL-SCNC: 24 MMOL/L (ref 22–29)
COLOR UR: YELLOW
CREAT SERPL-MCNC: 0.73 MG/DL (ref 0.55–1.02)
DEPRECATED CALCIDIOL+CALCIFEROL SERPL-MC: 29.1 NG/ML (ref 30–80)
EOSINOPHIL # BLD AUTO: 0.05 X10(3)/MCL (ref 0–0.9)
EOSINOPHIL NFR BLD AUTO: 1 %
ERYTHROCYTE [DISTWIDTH] IN BLOOD BY AUTOMATED COUNT: 12.4 % (ref 11.5–17)
EST. AVERAGE GLUCOSE BLD GHB EST-MCNC: 93.9 MG/DL
GFR SERPLBLD CREATININE-BSD FMLA CKD-EPI: >60 MLS/MIN/1.73/M2
GLOBULIN SER-MCNC: 3.3 GM/DL (ref 2.4–3.5)
GLUCOSE SERPL-MCNC: 84 MG/DL (ref 74–100)
GLUCOSE UR QL STRIP.AUTO: NEGATIVE MG/DL
HBA1C MFR BLD: 4.9 %
HCT VFR BLD AUTO: 42.1 % (ref 37–47)
HDLC SERPL-MCNC: 42 MG/DL (ref 35–60)
HGB BLD-MCNC: 13.4 G/DL (ref 12–16)
IMM GRANULOCYTES # BLD AUTO: 0 X10(3)/MCL (ref 0–0.04)
IMM GRANULOCYTES NFR BLD AUTO: 0 %
KETONES UR QL STRIP.AUTO: NEGATIVE MG/DL
LDLC SERPL CALC-MCNC: 119 MG/DL (ref 50–140)
LEUKOCYTE ESTERASE UR QL STRIP.AUTO: NEGATIVE UNIT/L
LYMPHOCYTES # BLD AUTO: 1.98 X10(3)/MCL (ref 0.6–4.6)
LYMPHOCYTES NFR BLD AUTO: 41.4 %
MCH RBC QN AUTO: 28.6 PG (ref 27–31)
MCHC RBC AUTO-ENTMCNC: 31.8 G/DL (ref 33–36)
MCV RBC AUTO: 89.8 FL (ref 80–94)
MONOCYTES # BLD AUTO: 0.31 X10(3)/MCL (ref 0.1–1.3)
MONOCYTES NFR BLD AUTO: 6.5 %
NEUTROPHILS # BLD AUTO: 2.42 X10(3)/MCL (ref 2.1–9.2)
NEUTROPHILS NFR BLD AUTO: 50.7 %
NITRITE UR QL STRIP.AUTO: NEGATIVE
PH UR STRIP.AUTO: 5.5 [PH]
PLATELET # BLD AUTO: 294 X10(3)/MCL (ref 130–400)
PMV BLD AUTO: 10.1 FL (ref 7.4–10.4)
POTASSIUM SERPL-SCNC: 4.1 MMOL/L (ref 3.5–5.1)
PROT SERPL-MCNC: 7 GM/DL (ref 6.4–8.3)
PROT UR QL STRIP.AUTO: NEGATIVE MG/DL
RBC # BLD AUTO: 4.69 X10(6)/MCL (ref 4.2–5.4)
RBC #/AREA URNS AUTO: ABNORMAL /HPF
RBC UR QL AUTO: ABNORMAL UNIT/L
SODIUM SERPL-SCNC: 140 MMOL/L (ref 136–145)
SP GR UR STRIP.AUTO: 1.02
SQUAMOUS #/AREA URNS AUTO: ABNORMAL /HPF
TRIGL SERPL-MCNC: 51 MG/DL (ref 37–140)
TSH SERPL-ACNC: 1.54 UIU/ML (ref 0.35–4.94)
UROBILINOGEN UR STRIP-ACNC: 0.2 MG/DL
VLDLC SERPL CALC-MCNC: 10 MG/DL
WBC # SPEC AUTO: 4.78 X10(3)/MCL (ref 4.5–11.5)
WBC #/AREA URNS AUTO: ABNORMAL /HPF

## 2023-05-29 PROCEDURE — 36415 COLL VENOUS BLD VENIPUNCTURE: CPT

## 2023-05-29 PROCEDURE — 83036 HEMOGLOBIN GLYCOSYLATED A1C: CPT

## 2023-05-29 PROCEDURE — 84443 ASSAY THYROID STIM HORMONE: CPT

## 2023-05-29 PROCEDURE — 80061 LIPID PANEL: CPT

## 2023-05-29 PROCEDURE — 82306 VITAMIN D 25 HYDROXY: CPT

## 2023-05-29 PROCEDURE — 85025 COMPLETE CBC W/AUTO DIFF WBC: CPT

## 2023-05-29 PROCEDURE — 80053 COMPREHEN METABOLIC PANEL: CPT

## 2023-05-31 ENCOUNTER — OFFICE VISIT (OUTPATIENT)
Dept: FAMILY MEDICINE | Facility: CLINIC | Age: 33
End: 2023-05-31
Payer: MEDICAID

## 2023-05-31 VITALS
RESPIRATION RATE: 16 BRPM | SYSTOLIC BLOOD PRESSURE: 98 MMHG | WEIGHT: 132.88 LBS | DIASTOLIC BLOOD PRESSURE: 62 MMHG | HEIGHT: 63 IN | BODY MASS INDEX: 23.54 KG/M2 | OXYGEN SATURATION: 98 % | HEART RATE: 86 BPM

## 2023-05-31 DIAGNOSIS — Z00.00 WELLNESS EXAMINATION: Primary | ICD-10-CM

## 2023-05-31 DIAGNOSIS — F32.A DEPRESSION, UNSPECIFIED DEPRESSION TYPE: ICD-10-CM

## 2023-05-31 PROCEDURE — 99395 PREV VISIT EST AGE 18-39: CPT | Mod: ,,, | Performed by: NURSE PRACTITIONER

## 2023-05-31 PROCEDURE — 3008F PR BODY MASS INDEX (BMI) DOCUMENTED: ICD-10-PCS | Mod: CPTII,,, | Performed by: NURSE PRACTITIONER

## 2023-05-31 PROCEDURE — 3078F PR MOST RECENT DIASTOLIC BLOOD PRESSURE < 80 MM HG: ICD-10-PCS | Mod: CPTII,,, | Performed by: NURSE PRACTITIONER

## 2023-05-31 PROCEDURE — 99395 PR PREVENTIVE VISIT,EST,18-39: ICD-10-PCS | Mod: ,,, | Performed by: NURSE PRACTITIONER

## 2023-05-31 PROCEDURE — 3008F BODY MASS INDEX DOCD: CPT | Mod: CPTII,,, | Performed by: NURSE PRACTITIONER

## 2023-05-31 PROCEDURE — 3074F SYST BP LT 130 MM HG: CPT | Mod: CPTII,,, | Performed by: NURSE PRACTITIONER

## 2023-05-31 PROCEDURE — 3078F DIAST BP <80 MM HG: CPT | Mod: CPTII,,, | Performed by: NURSE PRACTITIONER

## 2023-05-31 PROCEDURE — 1159F MED LIST DOCD IN RCRD: CPT | Mod: CPTII,,, | Performed by: NURSE PRACTITIONER

## 2023-05-31 PROCEDURE — 1159F PR MEDICATION LIST DOCUMENTED IN MEDICAL RECORD: ICD-10-PCS | Mod: CPTII,,, | Performed by: NURSE PRACTITIONER

## 2023-05-31 PROCEDURE — 3074F PR MOST RECENT SYSTOLIC BLOOD PRESSURE < 130 MM HG: ICD-10-PCS | Mod: CPTII,,, | Performed by: NURSE PRACTITIONER

## 2023-05-31 RX ORDER — FLUOXETINE 10 MG/1
10 CAPSULE ORAL DAILY
Qty: 30 CAPSULE | Refills: 3 | Status: SHIPPED | OUTPATIENT
Start: 2023-05-31 | End: 2023-09-01 | Stop reason: SDUPTHER

## 2023-05-31 NOTE — PROGRESS NOTES
"SUBJECTIVE:     History of Present Illness      Chief Complaint: Wellness    HPI: Patient is a 33 y.o. year old female who presents to clinic for  annual wellness and lab review.    The patient's general health status described as good.  Patient reports since starting on fluoxetine at last visit , her depression is much better.  She feels  medication is working.  She has more energy.  Denies SI, HI.  Pt wears glasses- last eye 3 months.   Drinks 6-7 sodas a day.   Non smoker   No complaints today     Review of Systems:    Review of Systems    12 point review of systems conducted, negative except as stated in the history of present illness. See HPI for details.     Previous History      Review of patient's allergies indicates:   Allergen Reactions    Ibuprofen Hives       History reviewed. No pertinent past medical history.  Current Outpatient Medications   Medication Instructions    FLUoxetine 10 mg, Oral, Daily    medroxyPROGESTERone (DEPO-PROVERA) 150 mg, Intramuscular, Every 3 months     Past Surgical History:   Procedure Laterality Date    TUBAL LIGATION       Family History   Problem Relation Age of Onset    Hypertension Mother     Hypertension Father        Social History     Tobacco Use    Smoking status: Never     Passive exposure: Never    Smokeless tobacco: Never   Substance Use Topics    Alcohol use: Not Currently    Drug use: Never        Health Maintenance      Health Maintenance   Topic Date Due    TETANUS VACCINE  Never done    Hepatitis C Screening  Completed    Lipid Panel  Completed       OBJECTIVE:     Physical Exam      Vital Signs Reviewed   Visit Vitals  BP 98/62 (BP Location: Left arm)   Pulse 86   Resp 16   Ht 5' 3" (1.6 m)   Wt 60.3 kg (132 lb 14.4 oz)   LMP 05/20/2023 (Approximate)   SpO2 98%   BMI 23.54 kg/m²       Physical Exam    Physical Exam:  General: Alert, well nourished, no acute distress, non-toxic appearing.   Eyes: Anicteric sclera, without conjunctival injection, normal lids, " no purulent drainage, EOMs grossly intact.   Ears: No tragal tenderness. Tympanic membranes intact, pearly grey, without effusion or erythema and with a positive light reflex.   Mouth: Posterior pharynx without erythema. No exudate, ulcerations, or lesion. No tonsillar swelling.   Neck: Supple, full ROM, no rigidity, no cervical adenopathy.   Cardio: Normal rate and rhythm    Resp: Respirations even and unlabored, clear to auscultation bilaterally.   Abd: No ecchymosis or distension. Normal bowel sounds in all 4 quadrants. No tenderness to palpation. No rebound tenderness or guarding. No CVA tenderness.   Skin: No rashes or open lesions noted.   MSK: No swelling. No abrasions or signs of trauma. Ambulating without assistance.   Neuro: Alert,oriented No focal deficits noted. Facial expressions even.   Psych: Cooperative, Normal affect      Procedures    Procedures     Labs     Results for orders placed or performed in visit on 05/29/23   Comprehensive Metabolic Panel   Result Value Ref Range    Sodium Level 140 136 - 145 mmol/L    Potassium Level 4.1 3.5 - 5.1 mmol/L    Chloride 112 (H) 98 - 107 mmol/L    Carbon Dioxide 24 22 - 29 mmol/L    Glucose Level 84 74 - 100 mg/dL    Blood Urea Nitrogen 12.8 7.0 - 18.7 mg/dL    Creatinine 0.73 0.55 - 1.02 mg/dL    Calcium Level Total 8.9 8.4 - 10.2 mg/dL    Protein Total 7.0 6.4 - 8.3 gm/dL    Albumin Level 3.7 3.5 - 5.0 g/dL    Globulin 3.3 2.4 - 3.5 gm/dL    Albumin/Globulin Ratio 1.1 1.1 - 2.0 ratio    Bilirubin Total 0.5 <=1.5 mg/dL    Alkaline Phosphatase 76 40 - 150 unit/L    Alanine Aminotransferase 8 0 - 55 unit/L    Aspartate Aminotransferase 13 5 - 34 unit/L    eGFR >60 mls/min/1.73/m2   Lipid Panel   Result Value Ref Range    Cholesterol Total 171 <=200 mg/dL    HDL Cholesterol 42 35 - 60 mg/dL    Triglyceride 51 37 - 140 mg/dL    Cholesterol/HDL Ratio 4 0 - 5    Very Low Density Lipoprotein 10     LDL Cholesterol 119.00 50.00 - 140.00 mg/dL   TSH   Result Value  Ref Range    Thyroid Stimulating Hormone 1.544 0.350 - 4.940 uIU/mL   Hemoglobin A1C   Result Value Ref Range    Hemoglobin A1c 4.9 <=7.0 %    Estimated Average Glucose 93.9 mg/dL   Vitamin D   Result Value Ref Range    Vit D 25 OH 29.1 (L) 30.0 - 80.0 ng/mL   CBC with Differential   Result Value Ref Range    WBC 4.78 4.50 - 11.50 x10(3)/mcL    RBC 4.69 4.20 - 5.40 x10(6)/mcL    Hgb 13.4 12.0 - 16.0 g/dL    Hct 42.1 37.0 - 47.0 %    MCV 89.8 80.0 - 94.0 fL    MCH 28.6 27.0 - 31.0 pg    MCHC 31.8 (L) 33.0 - 36.0 g/dL    RDW 12.4 11.5 - 17.0 %    Platelet 294 130 - 400 x10(3)/mcL    MPV 10.1 7.4 - 10.4 fL    Neut % 50.7 %    Lymph % 41.4 %    Mono % 6.5 %    Eos % 1.0 %    Basophil % 0.4 %    Lymph # 1.98 0.6 - 4.6 x10(3)/mcL    Neut # 2.42 2.1 - 9.2 x10(3)/mcL    Mono # 0.31 0.1 - 1.3 x10(3)/mcL    Eos # 0.05 0 - 0.9 x10(3)/mcL    Baso # 0.02 <=0.2 x10(3)/mcL    IG# 0.00 0 - 0.04 x10(3)/mcL    IG% 0.0 %       Chemistry:  Lab Results   Component Value Date     05/29/2023    K 4.1 05/29/2023    CHLORIDE 112 (H) 05/29/2023    BUN 12.8 05/29/2023    CREATININE 0.73 05/29/2023    EGFRNORACEVR >60 05/29/2023    GLUCOSE 84 05/29/2023    CALCIUM 8.9 05/29/2023    ALKPHOS 76 05/29/2023    LABPROT 7.0 05/29/2023    ALBUMIN 3.7 05/29/2023    BILIDIR 0.00 08/01/2018    IBILI 0.10 08/01/2018    AST 13 05/29/2023    ALT 8 05/29/2023    MG 1.90 04/13/2023    BQEHNDXT19PO 29.1 (L) 05/29/2023    TSH 1.544 05/29/2023        Lab Results   Component Value Date    HGBA1C 4.9 05/29/2023        Hematology:  Lab Results   Component Value Date    WBC 4.78 05/29/2023    HGB 13.4 05/29/2023    HCT 42.1 05/29/2023     05/29/2023       Lipid Panel:  Lab Results   Component Value Date    CHOL 171 05/29/2023    HDL 42 05/29/2023    .00 05/29/2023    TRIG 51 05/29/2023    TOTALCHOLEST 4 05/29/2023       Assessment            ICD-10-CM ICD-9-CM   1. Wellness examination  Z00.00 V70.0   2. Depression, unspecified depression type   F32.A 311       Plan       1. Wellness examination    2. Depression, unspecified depression type  Improved with medication   Denies SI, HI   Establish  good family support, reading, meditation, physical exam.  - FLUoxetine 10 MG capsule; Take 1 capsule (10 mg total) by mouth once daily.  Dispense: 30 capsule; Refill: 3    Orders Placed This Encounter    FLUoxetine 10 MG capsule      Medication List with Changes/Refills   Current Medications    MEDROXYPROGESTERONE (DEPO-PROVERA) 150 MG/ML SYRG    Inject 1 mL (150 mg total) into the muscle every 3 (three) months.   Changed and/or Refilled Medications    Modified Medication Previous Medication    FLUOXETINE 10 MG CAPSULE FLUoxetine 10 MG capsule       Take 1 capsule (10 mg total) by mouth once daily.    Take 1 capsule (10 mg total) by mouth once daily.   Discontinued Medications    METRONIDAZOLE (FLAGYL) 500 MG TABLET    Take 4 pills at one time on day one of treatment then take 4 pills at one time on day two of treatment.       Follow up in about 3 months (around 8/31/2023).   Follow up in about 3 months (around 8/31/2023). In addition to their scheduled follow up, the patient has also been instructed to follow up on as needed basis.   Future Appointments   Date Time Provider Department Center   6/7/2023  2:00 PM MD MANUELA [facility]   7/28/2023  9:00 AM Keven Zamudio MD [facility]   9/1/2023  9:00 AM KALPANA Bingham [facility]   6/3/2024  9:45 AM Muriel Benson [facility]

## 2023-06-05 ENCOUNTER — PATIENT OUTREACH (OUTPATIENT)
Dept: EMERGENCY MEDICINE | Facility: HOSPITAL | Age: 33
End: 2023-06-05
Payer: MEDICAID

## 2023-06-05 NOTE — PROGRESS NOTES
Spoke to pt for f/u. Applaud pt on attending pcp on 5/31/23. Discussed medication and budget friendly eating compliance, upcoming appt, ED utilization and stressed importance of f/u care with pcp and all providers and ancillary care. Pt reports that she has a bad headache on one side of her head, advised pt to call pcp to get recommendation and also encouraged pt to visit ucc/ed to be evaluated. Pt denies any other symptoms. Pt denies si/hi and mental health crisis. Pt voices understanding to instructions given and appreciation.   Appointment:  Nv- 7/28/23 at 9 am for depo, lv-5/2/23  with Keven Zamudio MD (gyn)  Nv- 9/1/23 at 9 am, Lv-5/9/23  with [facility]     Providers:  Keven Zamudio MD (gyn)  Bernardo Luo NP(mental health)  KALPANA Bingham(pcp)

## 2023-07-26 ENCOUNTER — PATIENT OUTREACH (OUTPATIENT)
Dept: EMERGENCY MEDICINE | Facility: HOSPITAL | Age: 33
End: 2023-07-26
Payer: MEDICAID

## 2023-07-27 ENCOUNTER — PATIENT OUTREACH (OUTPATIENT)
Dept: EMERGENCY MEDICINE | Facility: HOSPITAL | Age: 33
End: 2023-07-27
Payer: MEDICAID

## 2023-07-27 NOTE — PROGRESS NOTES
Spoke to pt for f/u. Discussed medication and budget friendly healthy eating compliance, upcoming appts, ED utilization and to utilize [facility] for non emergency issues and stressed importance of f/u care with pcp and all providers and ancillary care. Pt denies si/hi and mental health crisis(mental health managed by pcp).  Pt denies sodh barriers at this time. Pt reports still with occasional head aches, no head ache today. Pt does reports outer right ear with a painful knot x 1 week. Advised and offer pt assistance to call her pcp to notify of this and obtain sooner appt to be evaluated, pt agreed and called pcp office to try and obtain sooner appt on a conference call, office personal reports no sooner appt and got information from pt on her symptoms, pt reported outer right ear with a painful knot x 1 week, office personal reports she will given message to nurse to call pt. Pt voices understanding to instructions, call ended with office staff. Encouraged pt to visit [facility]/ED for worsening symptoms and if unable to get in with pcp for sooner appt. Pt denies any other concerns at this time. Pt voices understanding to instructions given and appreciation.   Appointment:  Nv- 7/28/23 at 9 am for depo, lv-5/2/23  with Keven Zamudio MD (gyn)  Nv- 9/1/23 at 9 am, Lv-5/9/23  with [facility]     Providers:  Keven Zamudio MD (gyn)  Bernardo Luo NP(mental health)  KALPANA Bingham(pcp)

## 2023-07-28 ENCOUNTER — PATIENT OUTREACH (OUTPATIENT)
Dept: EMERGENCY MEDICINE | Facility: HOSPITAL | Age: 33
End: 2023-07-28
Payer: MEDICAID

## 2023-07-28 NOTE — PROGRESS NOTES
Spoke to pt to check on her from discussion on yesterday. Discussed noted pcp f/u appt for Monday 7/31/23 at 11 am, stressed importance of attending appt for R ear evaluation. Pt denies si/hi and mental health crisis. Pt reports no other concerns at this time. Also encouraged pt to visit [facility]/ED for worsening symptoms. Pt voices understanding to instructions given and appreciation.   Appointment:  Nv- nv in 3 months, lv- 7/28/23 for depo, with Keven Zamudio MD (gyn)  Nv- 7/31/23 at 11 am for eval of R ear; 9/1/23 at 9 am, Lv-5/9/23  with [facility]     Providers:  Keven Zamudio MD (gyn)  Bernardo Luo NP(mental health)  KALPANA Bingham(pcp)

## 2023-07-31 ENCOUNTER — OFFICE VISIT (OUTPATIENT)
Dept: FAMILY MEDICINE | Facility: CLINIC | Age: 33
End: 2023-07-31
Payer: MEDICAID

## 2023-07-31 VITALS
RESPIRATION RATE: 18 BRPM | BODY MASS INDEX: 24.36 KG/M2 | SYSTOLIC BLOOD PRESSURE: 99 MMHG | DIASTOLIC BLOOD PRESSURE: 66 MMHG | HEIGHT: 63 IN | OXYGEN SATURATION: 98 % | WEIGHT: 137.5 LBS | HEART RATE: 85 BPM

## 2023-07-31 DIAGNOSIS — D17.30 LIPOMA OF SKIN: Primary | ICD-10-CM

## 2023-07-31 PROCEDURE — 1159F MED LIST DOCD IN RCRD: CPT | Mod: CPTII,,, | Performed by: NURSE PRACTITIONER

## 2023-07-31 PROCEDURE — 3074F SYST BP LT 130 MM HG: CPT | Mod: CPTII,,, | Performed by: NURSE PRACTITIONER

## 2023-07-31 PROCEDURE — 3044F PR MOST RECENT HEMOGLOBIN A1C LEVEL <7.0%: ICD-10-PCS | Mod: CPTII,,, | Performed by: NURSE PRACTITIONER

## 2023-07-31 PROCEDURE — 3044F HG A1C LEVEL LT 7.0%: CPT | Mod: CPTII,,, | Performed by: NURSE PRACTITIONER

## 2023-07-31 PROCEDURE — 3008F PR BODY MASS INDEX (BMI) DOCUMENTED: ICD-10-PCS | Mod: CPTII,,, | Performed by: NURSE PRACTITIONER

## 2023-07-31 PROCEDURE — 99213 PR OFFICE/OUTPT VISIT, EST, LEVL III, 20-29 MIN: ICD-10-PCS | Mod: ,,, | Performed by: NURSE PRACTITIONER

## 2023-07-31 PROCEDURE — 1159F PR MEDICATION LIST DOCUMENTED IN MEDICAL RECORD: ICD-10-PCS | Mod: CPTII,,, | Performed by: NURSE PRACTITIONER

## 2023-07-31 PROCEDURE — 3078F DIAST BP <80 MM HG: CPT | Mod: CPTII,,, | Performed by: NURSE PRACTITIONER

## 2023-07-31 PROCEDURE — 3074F PR MOST RECENT SYSTOLIC BLOOD PRESSURE < 130 MM HG: ICD-10-PCS | Mod: CPTII,,, | Performed by: NURSE PRACTITIONER

## 2023-07-31 PROCEDURE — 3078F PR MOST RECENT DIASTOLIC BLOOD PRESSURE < 80 MM HG: ICD-10-PCS | Mod: CPTII,,, | Performed by: NURSE PRACTITIONER

## 2023-07-31 PROCEDURE — 99213 OFFICE O/P EST LOW 20 MIN: CPT | Mod: ,,, | Performed by: NURSE PRACTITIONER

## 2023-07-31 PROCEDURE — 3008F BODY MASS INDEX DOCD: CPT | Mod: CPTII,,, | Performed by: NURSE PRACTITIONER

## 2023-07-31 NOTE — PROGRESS NOTES
"SUBJECTIVE:     History of Present Illness      Chief Complaint: Follow-up (Nodule behind right ear)    HPI:  Patient is a 33 y.o. year old female who presents to clinic for or bump behind her right ear.  Patient states that he has been all her life.  Photo in media  for reference  Lipoma appearing lesion, no drainage noted.  Patient states that it is getting tender to touch.    Review of Systems:    Review of Systems    12 point review of systems conducted, negative except as stated in the history of present illness. See HPI for details.     Previous History      Review of patient's allergies indicates:   Allergen Reactions    Ibuprofen Hives       No past medical history on file.  Current Outpatient Medications   Medication Instructions    FLUoxetine 10 mg, Oral, Daily    medroxyPROGESTERone (DEPO-PROVERA) 150 mg, Intramuscular, Every 3 months    NUVESSA 1.3 % (65 mg/5 gram) Gel 1 applicator, Vaginal, Nightly     Past Surgical History:   Procedure Laterality Date    TUBAL LIGATION       Family History   Problem Relation Age of Onset    Hypertension Mother     Hypertension Father        Social History     Tobacco Use    Smoking status: Never     Passive exposure: Never    Smokeless tobacco: Never   Substance Use Topics    Alcohol use: Not Currently    Drug use: Never        Health Maintenance      Health Maintenance   Topic Date Due    TETANUS VACCINE  Never done    Hepatitis C Screening  Completed    Lipid Panel  Completed       OBJECTIVE:     Physical Exam      Vital Signs Reviewed   Visit Vitals  BP 99/66 (BP Location: Left arm)   Pulse 85   Resp 18   Ht 5' 3" (1.6 m)   Wt 62.4 kg (137 lb 8 oz)   LMP  (LMP Unknown)   SpO2 98%   BMI 24.36 kg/m²       Physical Exam    Physical Exam:  General: Alert, well nourished, no acute distress, non-toxic appearing.   Eyes: Anicteric sclera, without conjunctival injection, normal lids, no purulent drainage, EOMs grossly intact.   Ears: No tragal tenderness. Tympanic membranes " intact, pearly grey, without effusion or erythema and with a positive light reflex.   Mouth: Posterior pharynx without erythema. No exudate, ulcerations, or lesion. No tonsillar swelling.   Neck: Supple, full ROM, no rigidity, no cervical adenopathy.   Cardio: Normal rate and rhythm    Resp: Respirations even and unlabored, clear to auscultation bilaterally.   Abd: No ecchymosis or distension. Normal bowel sounds in all 4 quadrants. No tenderness to palpation. No rebound tenderness or guarding. No CVA tenderness.   Skin: No rashes or open lesions noted.   MSK: No swelling. No abrasions or signs of trauma. Ambulating without assistance.   Neuro: Alert,oriented No focal deficits noted. Facial expressions even.   Psych: Cooperative, Normal affect      Procedures    Procedures     Labs     Results for orders placed or performed in visit on 07/28/23   POCT urine pregnancy   Result Value Ref Range    POC Preg Test, Ur Negative Negative     Acceptable Yes        Chemistry:  Lab Results   Component Value Date     05/29/2023    K 4.1 05/29/2023    CHLORIDE 112 (H) 05/29/2023    BUN 12.8 05/29/2023    CREATININE 0.73 05/29/2023    EGFRNORACEVR >60 05/29/2023    GLUCOSE 84 05/29/2023    CALCIUM 8.9 05/29/2023    ALKPHOS 76 05/29/2023    LABPROT 7.0 05/29/2023    ALBUMIN 3.7 05/29/2023    BILIDIR 0.00 08/01/2018    IBILI 0.10 08/01/2018    AST 13 05/29/2023    ALT 8 05/29/2023    MG 1.90 04/13/2023    IKREIXYR04AR 29.1 (L) 05/29/2023    TSH 1.544 05/29/2023        Lab Results   Component Value Date    HGBA1C 4.9 05/29/2023        Hematology:  Lab Results   Component Value Date    WBC 4.78 05/29/2023    HGB 13.4 05/29/2023    HCT 42.1 05/29/2023     05/29/2023       Lipid Panel:  Lab Results   Component Value Date    CHOL 171 05/29/2023    HDL 42 05/29/2023    .00 05/29/2023    TRIG 51 05/29/2023    TOTALCHOLEST 4 05/29/2023        Urine:  Lab Results   Component Value Date    COLORUA Yellow  05/29/2023    APPEARANCEUA Clear 05/29/2023    SGUA 1.025 05/29/2023    PHUA 5.5 05/29/2023    PROTEINUA Negative 05/29/2023    GLUCOSEUA Negative 05/29/2023    KETONESUA Negative 05/29/2023    BLOODUA Moderate (A) 05/29/2023    NITRITESUA Negative 05/29/2023    LEUKOCYTESUR Negative 05/29/2023    RBCUA 6-10 (A) 05/29/2023    WBCUA None Seen 05/29/2023    BACTERIA Few (A) 05/29/2023         Assessment            ICD-10-CM ICD-9-CM   1. Lipoma of skin  D17.30 214.1       Plan       1. Lipoma of skin  - US Soft Tissue Head Neck Thyroid; Future  - Ambulatory referral/consult to General Surgery; Future    Orders Placed This Encounter    US Soft Tissue Head Neck Thyroid    Ambulatory referral/consult to General Surgery      Medication List with Changes/Refills   Current Medications    FLUOXETINE 10 MG CAPSULE    Take 1 capsule (10 mg total) by mouth once daily.    MEDROXYPROGESTERONE (DEPO-PROVERA) 150 MG/ML SYRG    Inject 1 mL (150 mg total) into the muscle every 3 (three) months.    MEDROXYPROGESTERONE (DEPO-PROVERA) 150 MG/ML SYRG    Inject 1 mL (150 mg total) into the muscle every 3 (three) months. for 1 dose    MEDROXYPROGESTERONE (DEPO-PROVERA) 150 MG/ML SYRG    Inject 1 mL (150 mg total) into the muscle every 3 (three) months. for 1 dose    NUVESSA 1.3 % (65 MG/5 GRAM) GEL    Place 1 applicator vaginally every evening.       Follow up for Routine appointment as scheduled.   Follow up for Routine appointment as scheduled. In addition to their scheduled follow up, the patient has also been instructed to follow up on as needed basis.   Future Appointments   Date Time Provider Department Center   [facility]2023  9[facility]   10/24/2023  8:45 AM Keven Zamudio MD [facility]   6/3/2024  9:45 AM Muriel Benson FNP [facility]

## 2023-08-22 ENCOUNTER — HOSPITAL ENCOUNTER (OUTPATIENT)
Dept: RADIOLOGY | Facility: HOSPITAL | Age: 33
Discharge: HOME | End: 2023-08-22
Attending: NURSE PRACTITIONER
Payer: MEDICAID

## 2023-08-22 DIAGNOSIS — D17.30 LIPOMA OF SKIN: ICD-10-CM

## 2023-08-22 PROCEDURE — 76536 US EXAM OF HEAD AND NECK: CPT | Mod: TC

## 2023-08-25 ENCOUNTER — PATIENT OUTREACH (OUTPATIENT)
Dept: EMERGENCY MEDICINE | Facility: HOSPITAL | Age: 33
End: 2023-08-25
Payer: MEDICAID

## 2023-09-01 ENCOUNTER — OFFICE VISIT (OUTPATIENT)
Dept: FAMILY MEDICINE | Facility: CLINIC | Age: 33
End: 2023-09-01
Payer: MEDICAID

## 2023-09-01 VITALS
HEART RATE: 92 BPM | BODY MASS INDEX: 26.22 KG/M2 | DIASTOLIC BLOOD PRESSURE: 75 MMHG | WEIGHT: 148 LBS | OXYGEN SATURATION: 96 % | SYSTOLIC BLOOD PRESSURE: 111 MMHG | TEMPERATURE: 98 F

## 2023-09-01 DIAGNOSIS — D17.30 LIPOMA OF SKIN: ICD-10-CM

## 2023-09-01 DIAGNOSIS — F32.A DEPRESSION, UNSPECIFIED DEPRESSION TYPE: Primary | ICD-10-CM

## 2023-09-01 PROCEDURE — 1159F MED LIST DOCD IN RCRD: CPT | Mod: CPTII,,, | Performed by: NURSE PRACTITIONER

## 2023-09-01 PROCEDURE — 1159F PR MEDICATION LIST DOCUMENTED IN MEDICAL RECORD: ICD-10-PCS | Mod: CPTII,,, | Performed by: NURSE PRACTITIONER

## 2023-09-01 PROCEDURE — 3044F PR MOST RECENT HEMOGLOBIN A1C LEVEL <7.0%: ICD-10-PCS | Mod: CPTII,,, | Performed by: NURSE PRACTITIONER

## 2023-09-01 PROCEDURE — 3008F BODY MASS INDEX DOCD: CPT | Mod: CPTII,,, | Performed by: NURSE PRACTITIONER

## 2023-09-01 PROCEDURE — 99213 PR OFFICE/OUTPT VISIT, EST, LEVL III, 20-29 MIN: ICD-10-PCS | Mod: ,,, | Performed by: NURSE PRACTITIONER

## 2023-09-01 PROCEDURE — 3008F PR BODY MASS INDEX (BMI) DOCUMENTED: ICD-10-PCS | Mod: CPTII,,, | Performed by: NURSE PRACTITIONER

## 2023-09-01 PROCEDURE — 3074F SYST BP LT 130 MM HG: CPT | Mod: CPTII,,, | Performed by: NURSE PRACTITIONER

## 2023-09-01 PROCEDURE — 3078F DIAST BP <80 MM HG: CPT | Mod: CPTII,,, | Performed by: NURSE PRACTITIONER

## 2023-09-01 PROCEDURE — 3074F PR MOST RECENT SYSTOLIC BLOOD PRESSURE < 130 MM HG: ICD-10-PCS | Mod: CPTII,,, | Performed by: NURSE PRACTITIONER

## 2023-09-01 PROCEDURE — 99213 OFFICE O/P EST LOW 20 MIN: CPT | Mod: ,,, | Performed by: NURSE PRACTITIONER

## 2023-09-01 PROCEDURE — 3078F PR MOST RECENT DIASTOLIC BLOOD PRESSURE < 80 MM HG: ICD-10-PCS | Mod: CPTII,,, | Performed by: NURSE PRACTITIONER

## 2023-09-01 PROCEDURE — 3044F HG A1C LEVEL LT 7.0%: CPT | Mod: CPTII,,, | Performed by: NURSE PRACTITIONER

## 2023-09-01 RX ORDER — FLUOXETINE 10 MG/1
10 CAPSULE ORAL DAILY
Qty: 30 CAPSULE | Refills: 3 | Status: SHIPPED | OUTPATIENT
Start: 2023-09-01 | End: 2024-01-29

## 2023-09-01 NOTE — PROGRESS NOTES
SUBJECTIVE:     History of Present Illness      Chief Complaint: Follow-up (3 mnth ) and Referral    HPI:  Patient is a 33 y.o. year old female who presents to clinic for 3 month follow doing well with depression meds.    She recently had  US of cyst  behind her right ear.     Review of Systems:    Review of Systems    12 point review of systems conducted, negative except as stated in the history of present illness. See HPI for details.     Previous History      Review of patient's allergies indicates:   Allergen Reactions    Ibuprofen Hives       Past Medical History:   Diagnosis Date    Depression 9/1/2023     Current Outpatient Medications   Medication Instructions    FLUoxetine 10 mg, Oral, Daily    medroxyPROGESTERone (DEPO-PROVERA) 150 mg, Intramuscular, Every 3 months    NUVESSA 1.3 % (65 mg/5 gram) Gel 1 applicator, Vaginal, Nightly     Past Surgical History:   Procedure Laterality Date    TUBAL LIGATION       Family History   Problem Relation Age of Onset    Hypertension Mother     Hypertension Father        Social History     Tobacco Use    Smoking status: Never     Passive exposure: Never    Smokeless tobacco: Never   Substance Use Topics    Alcohol use: Not Currently    Drug use: Never        Health Maintenance      Health Maintenance   Topic Date Due    TETANUS VACCINE  Never done    Hepatitis C Screening  Completed    Lipid Panel  Completed       OBJECTIVE:     Physical Exam      Vital Signs Reviewed   Visit Vitals  /75 (BP Location: Right arm)   Pulse 92   Temp 98.2 °F (36.8 °C)   Wt 67.1 kg (148 lb)   SpO2 96%   BMI 26.22 kg/m²       Physical Exam    Physical Exam:  General: Alert, well nourished, no acute distress, non-toxic appearing.   Eyes: Anicteric sclera, without conjunctival injection, normal lids, no purulent drainage, EOMs grossly intact.   Ears: No tragal tenderness. Tympanic membranes intact, pearly grey, without effusion or erythema and with a positive light reflex.   Mouth:  Posterior pharynx without erythema. No exudate, ulcerations, or lesion. No tonsillar swelling.   Neck: Supple, full ROM, no rigidity, no cervical adenopathy.   Cardio: Normal rate and rhythm    Resp: Respirations even and unlabored, clear to auscultation bilaterally.   Abd: No ecchymosis or distension. Normal bowel sounds in all 4 quadrants. No tenderness to palpation. No rebound tenderness or guarding. No CVA tenderness.   Skin: lump behind right ear   MSK: No swelling. No abrasions or signs of trauma. Ambulating without assistance.   Neuro: Alert,oriented No focal deficits noted. Facial expressions even.   Psych: Cooperative, Normal affect          Labs     Results for orders placed or performed in visit on 07/28/23   POCT urine pregnancy   Result Value Ref Range    POC Preg Test, Ur Negative Negative     Acceptable Yes        Chemistry:  Lab Results   Component Value Date     05/29/2023    K 4.1 05/29/2023    CHLORIDE 112 (H) 05/29/2023    BUN 12.8 05/29/2023    CREATININE 0.73 05/29/2023    EGFRNORACEVR >60 05/29/2023    GLUCOSE 84 05/29/2023    CALCIUM 8.9 05/29/2023    ALKPHOS 76 05/29/2023    LABPROT 7.0 05/29/2023    ALBUMIN 3.7 05/29/2023    BILIDIR 0.00 08/01/2018    IBILI 0.10 08/01/2018    AST 13 05/29/2023    ALT 8 05/29/2023    MG 1.90 04/13/2023    [id number] 29.1 (L) 05/29/2023    TSH 1.544 05/29/2023        Lab Results   Component Value Date    HGBA1C 4.9 05/29/2023        Hematology:  Lab Results   Component Value Date    WBC 4.78 05/29/2023    HGB 13.4 05/29/2023    HCT 42.1 05/29/2023     05/29/2023       Lipid Panel:  Lab Results   Component Value Date    CHOL 171 05/29/2023    HDL 42 05/29/2023    .00 05/29/2023    TRIG 51 05/29/2023    TOTALCHOLEST 4 05/29/2023        Urine:  Lab Results   Component Value Date    COLORUA Yellow 05/29/2023    APPEARANCEUA Clear 05/29/2023    SGUA 1.025 05/29/2023    PHUA 5.5 05/29/2023    PROTEINUA Negative 05/29/2023     GLUCOSEUA Negative 05/29/2023    KETONESUA Negative 05/29/2023    BLOODUA Moderate (A) 05/29/2023    NITRITESUA Negative 05/29/2023    LEUKOCYTESUR Negative 05/29/2023    RBCUA 6-10 (A) 05/29/2023    WBCUA None Seen 05/29/2023    BACTERIA Few (A) 05/29/2023         Assessment            ICD-10-CM ICD-9-CM   1. Depression, unspecified depression type  F32.A 311   2. Lipoma of skin  D17.30 214.1       Plan       1. Depression, unspecified depression type  - FLUoxetine 10 MG capsule; Take 1 capsule (10 mg total) by mouth once daily.  Dispense: 30 capsule; Refill: 3    2. Lipoma of skin  - Ambulatory referral/consult to General Surgery; Future    Orders Placed This Encounter    Ambulatory referral/consult to General Surgery    FLUoxetine 10 MG capsule      Medication List with Changes/Refills   Current Medications    MEDROXYPROGESTERONE (DEPO-PROVERA) 150 MG/ML SYRG    Inject 1 mL (150 mg total) into the muscle every 3 (three) months.    NUVESSA 1.3 % (65 MG/5 GRAM) GEL    Place 1 applicator vaginally every evening.   Changed and/or Refilled Medications    Modified Medication Previous Medication    FLUOXETINE 10 MG CAPSULE FLUoxetine 10 MG capsule       Take 1 capsule (10 mg total) by mouth once daily.    Take 1 capsule (10 mg total) by mouth once daily.       Follow up in about 6 months (around 3/1/2024) for 6 MONTH FOLLOW UP.   Follow up in about 6 months (around 3/1/2024) for 6 MONTH FOLLOW UP. In addition to their scheduled follow up, the patient has also been instructed to follow up on as needed basis.   Future Appointments   Date Time Provider Department Center   10/24/2023  8:45 AM [facility]ud[facility]   3/1/2024  9:00 AM Muriel Benson FNP [facility]   6/3/2024  9:45 AM Muriel Benson FNP [facility]

## 2023-09-06 ENCOUNTER — TELEPHONE (OUTPATIENT)
Dept: FAMILY MEDICINE | Facility: CLINIC | Age: 33
End: 2023-09-06

## 2023-09-06 NOTE — TELEPHONE ENCOUNTER
----- Message from KALPANA Bingham sent at 9/6/2023  9:53 AM CDT -----  Regarding: RE: Referral  Please referral to Dr. Wilson at [facility]   ----- Message -----  From: Anisa Pittman LPN  Sent: 9/5/2023   1:52 PM CDT  To: KALPANA Bingham  Subject: Referral                                         Sheba Benson,   Dr. Thomas office is requesting you specify a referring provider at [facility] in [place]. Once specified, the referral needs to be faxed again along with demographics.   ----- Message -----  From: Jenn Malloy  Sent: 9/5/2023  11:10 AM CDT  To: Kelley Llamas Staff    .Type:  Needs Medical Advice    Who Called:  Dr. William Scott      Would the patient rather a call back? Yes    Best Call Back Number:  [phone] or fax# [phone]    Additional Information:  please refax the referral and send demographics

## 2023-09-08 NOTE — PROGRESS NOTES
Spoke to pt for f/u. Discussed medication and budget friendly healthy eating, upcoming appt, ED utilization and to utilize [facility] for non emergency issues with when pcp is not available, stressed importance of f/u care with pcp and all providers and ancillary care and being compliant with appts, health/mental health care and treatment plan. Pt denies si/hi and mental health crisis. Pt denies sdoh barriers at this time. Pt voices understanding to instructions given and appreciation.     Appointment:  Nv- nv in 3 months, lv- 7/28/23 for depo, with Keven Zamudio MD (gyn)  Nv- 9/1/23 at 9 am Lv- 7/31/23  with KALPANA Bingham(pcp) at [facility]     Providers:  Keven Zamudio MD (gyn)  Bernardo Luo NP(mental health)  KALPANA Bingham(pcp)

## 2023-09-22 ENCOUNTER — PATIENT OUTREACH (OUTPATIENT)
Dept: EMERGENCY MEDICINE | Facility: HOSPITAL | Age: 33
End: 2023-09-22
Payer: MEDICAID

## 2023-09-22 NOTE — PROGRESS NOTES
Spoke to pt for f/u, doing well, no complaints. Applaud pt on attending pcp appt on 9/1/23. Discussed medication and budget friendly healthy eating compliance, ED utilization and to utilize [facility] for non emergency issues when pcp is not available, upcoming appts and stressed importance of f/u care with pcp and all providers and ancillary care. Pt denies si/hi and mental health crisis. Pt denies sodh barriers at this time. Encouragement given to pt. Pt voices understanding to instructions given and appreciation.     Appointment:  Candy kim in 3 months, lv- 7/28/23 for depo, with Keven Zamudio MD (gyn)  Nv- 3/1/24 at 9 am and 6/3/24 at 9:15 am Lv- 9/1/23  with KALPANA Bingham(pcp) at [facility]     Providers:  Keven Zamudio MD (gyn)  Bernardo Luo NP(mental health)  KALPANA Bingham(pcp)

## 2023-10-20 ENCOUNTER — PATIENT OUTREACH (OUTPATIENT)
Dept: EMERGENCY MEDICINE | Facility: HOSPITAL | Age: 33
End: 2023-10-20
Payer: MEDICAID

## 2023-10-23 ENCOUNTER — PATIENT OUTREACH (OUTPATIENT)
Dept: EMERGENCY MEDICINE | Facility: HOSPITAL | Age: 33
End: 2023-10-23
Payer: MEDICAID

## 2023-10-23 NOTE — PROGRESS NOTES
Spoke to pt for f/u, doing well, no complaints. Discussed medication and budget friendly healthy eating compliance, ED utilization and to utilize [facility] for non emergency issues when pcp is not available, upcoming appts and stressed importance of f/u care with pcp and all providers and ancillary care. Pt denies si/hi and mental health crisis. Pt denies sodh barriers at this time. Applaud pt on no ED visit > 6 months and encourage pt to use [facility] for non emergency issues and ED for emergency issues and to call her pcp when needed.  Offered pt to mail appt letter to her, pt agreed to mail appt letter to her, verified pt address with pt. Pt voices understanding to instructions given and appreciation.   Closed/resolved episode due to no ED visits > 6 months. Advised pt to call if need in the further. Pt voices understanding and appreciation.     Appointment:  Nv- every 3 months  with Keven Zamudio MD (gyn) for Depo  Nv- 3/1/24 at 9 am and 6/3/24 at 9:15 am Lv- 9/1/23  with KALPANA Bingham(pcp) at [facility]     Providers:  Keven Zamudio MD (gyn)  Bernrado Luo NP(mental health)  KALPANA Bingham(pcp)

## 2023-12-21 ENCOUNTER — TELEPHONE (OUTPATIENT)
Dept: FAMILY MEDICINE | Facility: CLINIC | Age: 33
End: 2023-12-21
Payer: MEDICAID

## 2023-12-21 NOTE — TELEPHONE ENCOUNTER
----- Message from Jenn Senegal sent at 12/21/2023  1:29 PM CST -----  .Type:  Patient Requesting Referral    Who Called: pt    Does the patient already have the specialty appointment scheduled?: no    If yes, what is the date of that appointment?: no    Referral to What Specialty: mental Health    Reason for Referral: Mental Health issues    Does the patient want the referral with a specific physician?: no    Is the specialist an [facility]?: non    Patient Requesting a Response?: yes    Would the patient rather a call back or a response via [facility]?     Best Call Back Number: [phone]    Additional Information:  needs a referral for mental health

## 2024-01-29 ENCOUNTER — OFFICE VISIT (OUTPATIENT)
Dept: FAMILY MEDICINE | Facility: CLINIC | Age: 34
End: 2024-01-29
Payer: MEDICAID

## 2024-01-29 VITALS
HEIGHT: 63 IN | BODY MASS INDEX: 29.57 KG/M2 | HEART RATE: 97 BPM | WEIGHT: 166.88 LBS | TEMPERATURE: 99 F | RESPIRATION RATE: 17 BRPM | OXYGEN SATURATION: 100 % | SYSTOLIC BLOOD PRESSURE: 100 MMHG | DIASTOLIC BLOOD PRESSURE: 67 MMHG

## 2024-01-29 DIAGNOSIS — F32.A DEPRESSION, UNSPECIFIED DEPRESSION TYPE: Primary | ICD-10-CM

## 2024-01-29 PROCEDURE — 3078F DIAST BP <80 MM HG: CPT | Mod: CPTII,,, | Performed by: NURSE PRACTITIONER

## 2024-01-29 PROCEDURE — 1159F MED LIST DOCD IN RCRD: CPT | Mod: CPTII,,, | Performed by: NURSE PRACTITIONER

## 2024-01-29 PROCEDURE — 3008F BODY MASS INDEX DOCD: CPT | Mod: CPTII,,, | Performed by: NURSE PRACTITIONER

## 2024-01-29 PROCEDURE — 3074F SYST BP LT 130 MM HG: CPT | Mod: CPTII,,, | Performed by: NURSE PRACTITIONER

## 2024-01-29 PROCEDURE — 99213 OFFICE O/P EST LOW 20 MIN: CPT | Mod: ,,, | Performed by: NURSE PRACTITIONER

## 2024-01-29 RX ORDER — FLUOXETINE HYDROCHLORIDE 20 MG/1
20 CAPSULE ORAL DAILY
Qty: 30 CAPSULE | Refills: 1 | Status: SHIPPED | OUTPATIENT
Start: 2024-01-29 | End: 2024-03-01 | Stop reason: SDUPTHER

## 2024-01-29 NOTE — PROGRESS NOTES
SUBJECTIVE:     History of Present Illness      Chief Complaint: Follow-up (Here for referral to mental health.  C/O feeling depressed, not suicidal, feeling lonely.  Taking Fluoxetine 10mg at bedtime.  Would like an RX for something during the day.)    HPI:  Patient is a 33 y.o. year old female who presents to clinic for depression follow-up.  Patient states lately she feels more depressed.  She states that she is little energy and motivation to want to do activities.  States that she is has trouble focusing staying on task.  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  States that it is hard for her to get up and complete tasks such as doing laundry falling close etc..  Denies SI, HI or hallucinations.  Currently on fluoxetine at 10 mg at bedtime.  Review of Systems:    Review of Systems    12 point review of systems conducted, negative except as stated in the history of present illness. See HPI for details.     Previous History    Muriel Benson, RAYSHAWNP  Review of patient's allergies indicates:   Allergen Reactions    Ibuprofen Hives    Shellfish containing products      Other Reaction(s): Unknown    Tramadol      Other Reaction(s): Unknown       Past Medical History:   Diagnosis Date    Depression 9/1/2023     Current Outpatient Medications   Medication Instructions    FLUoxetine 20 mg, Oral, Daily    medroxyPROGESTERone (DEPO-PROVERA) 150 mg, Intramuscular, Every 3 months    NUVESSA 1.3 % (65 mg/5 gram) Gel 1 applicator, Vaginal, Nightly     Past Surgical History:   Procedure Laterality Date    CYST REMOVAL      behind right ear    TUBAL LIGATION       Family History   Problem Relation Age of Onset    Hypertension Mother     Hypertension Father        Social History     Tobacco Use    Smoking status: Never     Passive exposure: Never    Smokeless tobacco: Never   Substance Use Topics    Alcohol use: Not Currently    Drug use: Never        Health Maintenance      Health Maintenance   Topic Date Due    TETANUS  "VACCINE  Never done    Hepatitis C Screening  Completed    Lipid Panel  Completed       OBJECTIVE:     Physical Exam      Vital Signs Reviewed   Visit Vitals  /67 (BP Location: Left arm, Patient Position: Sitting)   Pulse 97   Temp 98.5 °F (36.9 °C) (Oral)   Resp 17   Ht 5' 3" (1.6 m)   Wt 75.7 kg (166 lb 14.4 oz)   LMP  (LMP Unknown)   SpO2 100%   BMI 29.57 kg/m²       Physical Exam    Physical Exam:  General: Alert, well nourished, no acute distress, non-toxic appearing.   Eyes: Anicteric sclera, without conjunctival injection, normal lids, no purulent drainage, EOMs grossly intact.   Ears: No tragal tenderness. Tympanic membranes intact, pearly grey, without effusion or erythema and with a positive light reflex.   Mouth: Posterior pharynx without erythema. No exudate, ulcerations, or lesion. No tonsillar swelling.   Neck: Supple, full ROM, no rigidity, no cervical adenopathy.   Cardio: Normal rate and rhythm    Resp: Respirations even and unlabored, clear to auscultation bilaterally.   Abd: No ecchymosis or distension. Normal bowel sounds in all 4 quadrants. No tenderness to palpation. No rebound tenderness or guarding. No CVA tenderness.   Skin: No rashes or open lesions noted.   MSK: No swelling. No abrasions or signs of trauma. Ambulating without assistance.   Neuro: Alert,oriented No focal deficits noted. Facial expressions even.   Psych: Cooperative, Normal affect      Procedures    Procedures     Labs     Results for orders placed or performed in visit on 01/18/24   POCT urine pregnancy   Result Value Ref Range    POC Preg Test, Ur Negative Negative     Acceptable Yes        Chemistry:  Lab Results   Component Value Date     05/29/2023    K 4.1 05/29/2023    CHLORIDE 112 (H) 05/29/2023    BUN 12.8 05/29/2023    CREATININE 0.73 05/29/2023    EGFRNORACEVR >60 05/29/2023    GLUCOSE 84 05/29/2023    CALCIUM 8.9 05/29/2023    ALKPHOS 76 05/29/2023    LABPROT 7.0 05/29/2023    ALBUMIN " 3.7 05/29/2023    BILIDIR 0.00 08/01/2018    IBILI 0.10 08/01/2018    AST 13 05/29/2023    ALT 8 05/29/2023    MG 1.90 04/13/2023    [id number] 29.1 (L) 05/29/2023    TSH 1.544 05/29/2023        Lab Results   Component Value Date    HGBA1C 4.9 05/29/2023        Hematology:  Lab Results   Component Value Date    WBC 4.78 05/29/2023    HGB 13.4 05/29/2023    HCT 42.1 05/29/2023     05/29/2023       Lipid Panel:  Lab Results   Component Value Date    CHOL 171 05/29/2023    HDL 42 05/29/2023    .00 05/29/2023    TRIG 51 05/29/2023    TOTALCHOLEST 4 05/29/2023        Urine:  Lab Results   Component Value Date    COLORUA Yellow 05/29/2023    APPEARANCEUA Clear 05/29/2023    SGUA 1.025 05/29/2023    PHUA 5.5 05/29/2023    PROTEINUA Negative 05/29/2023    GLUCOSEUA Negative 05/29/2023    KETONESUA Negative 05/29/2023    BLOODUA Moderate (A) 05/29/2023    NITRITESUA Negative 05/29/2023    LEUKOCYTESUR Negative 05/29/2023    RBCUA 6-10 (A) 05/29/2023    WBCUA None Seen 05/29/2023    BACTERIA Few (A) 05/29/2023         Assessment            ICD-10-CM ICD-9-CM   1. Depression, unspecified depression type  F32.A 311       Plan       1. Depression, unspecified depression type    Increase fluoxetine to 20 mg daily.  Encouraged patient to establish good family, social support.  Readings, meditation, prayer physical activity.  ED precautions discussed.  Encouraged involvement in social and community organizations  - Ambulatory referral/consult to Psychiatry; Future  - FLUoxetine 20 MG capsule; Take 1 capsule (20 mg total) by mouth once daily.  Dispense: 30 capsule; Refill: 1    Orders Placed This Encounter    Ambulatory referral/consult to Psychiatry    FLUoxetine 20 MG capsule      Medication List with Changes/Refills   New Medications    FLUOXETINE 20 MG CAPSULE    Take 1 capsule (20 mg total) by mouth once daily.   Current Medications    MEDROXYPROGESTERONE (DEPO-PROVERA) 150 MG/ML SYRG    Inject 1 mL (150 mg  total) into the muscle every 3 (three) months.    NUVESSA 1.3 % (65 MG/5 GRAM) GEL    Place 1 applicator vaginally every evening.   Discontinued Medications    FLUOXETINE 10 MG CAPSULE    Take 1 capsule (10 mg total) by mouth once daily.       No follow-ups on file.   No follow-ups on file. In addition to their scheduled follow up, the patient has also been instructed to follow up on as needed basis.   Future Appointments   Date Time Provider Department Center   3/1/2024  9:00 AM Muriel Benson FNP [facility]   4/18/2024  8:45 AM Keven Zamudio MD [facility]   6/3/2024  9:45 AM Muriel Benson FNP [facility]

## 2024-02-29 NOTE — PROGRESS NOTES
SUBJECTIVE:     History of Present Illness      Chief Complaint: Follow-up (6 week f/u - medication is helping )    HPI:  Patient is a 33 y.o. year old female who presents to clinic for 6 weeks depression follow up. Pt report since increasing fluoxetine depression has improved somewhat.   Denies SI, Hi or hallucinations      She does have appointment with mental health in 3 weeks - appointment in computer in [facility].  Review of Systems:    Review of Systems    12 point review of systems conducted, negative except as stated in the history of present illness. See HPI for details.     Previous History    Muriel Benson, RAYSHAWNP  Review of patient's allergies indicates:   Allergen Reactions    Ibuprofen Hives    Shellfish containing products      Other Reaction(s): Unknown    Tramadol      Other Reaction(s): Unknown       Past Medical History:   Diagnosis Date    Depression 9/1/2023     Current Outpatient Medications   Medication Instructions    FLUoxetine 20 mg, Oral, Daily    medroxyPROGESTERone (DEPO-PROVERA) 150 mg, Intramuscular, Every 3 months     Past Surgical History:   Procedure Laterality Date    CYST REMOVAL      behind right ear    TUBAL LIGATION       Family History   Problem Relation Age of Onset    Hypertension Mother     Hypertension Father        Social History     Tobacco Use    Smoking status: Never     Passive exposure: Never    Smokeless tobacco: Never   Substance Use Topics    Alcohol use: Not Currently    Drug use: Never        Health Maintenance      Health Maintenance   Topic Date Due    TETANUS VACCINE  06/04/2031    Hepatitis C Screening  Completed    Lipid Panel  Completed       OBJECTIVE:     Physical Exam      Vital Signs Reviewed   Visit Vitals  /78   Pulse 96   Temp 98.5 °F (36.9 °C)   Wt 77.5 kg (170 lb 12.8 oz)   SpO2 (P) 98%   BMI 30.26 kg/m²       Physical Exam    Physical Exam:  General: Alert, well nourished, no acute distress, non-toxic appearing.   Eyes:  Anicteric sclera, without conjunctival injection, normal lids, no purulent drainage, EOMs grossly intact.   Ears: No tragal tenderness. Tympanic membranes intact, pearly grey, without effusion or erythema and with a positive light reflex.   Mouth: Posterior pharynx without erythema. No exudate, ulcerations, or lesion. No tonsillar swelling.   Neck: Supple, full ROM, no rigidity, no cervical adenopathy.   Cardio: Normal rate and rhythm    Resp: Respirations even and unlabored, clear to auscultation bilaterally.   Abd: No ecchymosis or distension. Normal bowel sounds in all 4 quadrants. No tenderness to palpation. No rebound tenderness or guarding. No CVA tenderness.   Skin: No rashes or open lesions noted.   MSK: No swelling. No abrasions or signs of trauma. Ambulating without assistance.   Neuro: Alert,oriented No focal deficits noted. Facial expressions even.   Psych: Cooperative, Normal affect      Procedures    Procedures     Labs     Results for orders placed or performed in visit on 01/18/24   POCT urine pregnancy   Result Value Ref Range    POC Preg Test, Ur Negative Negative     Acceptable Yes        Chemistry:  Lab Results   Component Value Date     05/29/2023    K 4.1 05/29/2023    CHLORIDE 112 (H) 05/29/2023    BUN 12.8 05/29/2023    CREATININE 0.73 05/29/2023    EGFRNORACEVR >60 05/29/2023    GLUCOSE 84 05/29/2023    CALCIUM 8.9 05/29/2023    ALKPHOS 76 05/29/2023    LABPROT 7.0 05/29/2023    ALBUMIN 3.7 05/29/2023    BILIDIR 0.00 08/01/2018    IBILI 0.10 08/01/2018    AST 13 05/29/2023    ALT 8 05/29/2023    MG 1.90 04/13/2023    [id number] 29.1 (L) 05/29/2023    TSH 1.544 05/29/2023        Lab Results   Component Value Date    HGBA1C 4.9 05/29/2023        Hematology:  Lab Results   Component Value Date    WBC 4.78 05/29/2023    HGB 13.4 05/29/2023    HCT 42.1 05/29/2023     05/29/2023       Lipid Panel:  Lab Results   Component Value Date    CHOL 171 05/29/2023    HDL 42  05/29/2023    .00 05/29/2023    TRIG 51 05/29/2023    TOTALCHOLEST 4 05/29/2023        Urine:  Lab Results   Component Value Date    COLORUA Yellow 05/29/2023    APPEARANCEUA Clear 05/29/2023    SGUA 1.025 05/29/2023    PHUA 5.5 05/29/2023    PROTEINUA Negative 05/29/2023    GLUCOSEUA Negative 05/29/2023    KETONESUA Negative 05/29/2023    BLOODUA Moderate (A) 05/29/2023    NITRITESUA Negative 05/29/2023    LEUKOCYTESUR Negative 05/29/2023    RBCUA 6-10 (A) 05/29/2023    WBCUA None Seen 05/29/2023    BACTERIA Few (A) 05/29/2023         Assessment            ICD-10-CM ICD-9-CM   1. Medication refill  Z76.0 V68.1   2. Depression, unspecified depression type  F32.A 311       Plan       1. Depression, unspecified depression type  Stable/improved with medication dosage increase  Continue fluoxetine daily as prescribed patient has follow-up appointment with mental health March 28 denies SI, HI or hallucinations    - CBC Auto Differential; Future  - Comprehensive Metabolic Panel; Future  - Lipid Panel; Future  - TSH; Future  - Hemoglobin A1C; Future  - Vitamin D; Future    2. Medication refill  - FLUoxetine 20 MG capsule; Take 1 capsule (20 mg total) by mouth once daily.  Dispense: 30 capsule; Refill: 1  For routine wellness in June labs prior  Orders Placed This Encounter    CBC Auto Differential    Comprehensive Metabolic Panel    Lipid Panel    TSH    Hemoglobin A1C    Vitamin D    FLUoxetine 20 MG capsule      Medication List with Changes/Refills   Current Medications    MEDROXYPROGESTERONE (DEPO-PROVERA) 150 MG/ML SYRG    Inject 1 mL (150 mg total) into the muscle every 3 (three) months.   Changed and/or Refilled Medications    Modified Medication Previous Medication    FLUOXETINE 20 MG CAPSULE FLUoxetine 20 MG capsule       Take 1 capsule (20 mg total) by mouth once daily.    Take 1 capsule (20 mg total) by mouth once daily.       Follow up if symptoms worsen or fail to improve, for Wellness already scheduled,  LABS Prior.   Follow up if symptoms worsen or fail to improve, for Wellness already scheduled, LABS Prior. In addition to their scheduled follow up, the patient has also been instructed to follow up on as needed basis.   Future Appointments   Date Time Provider Department Center   3/28/2024  8:00 AM Kimberly Escobedo APRN [facility]   4/18/2024  8:45 AM Keven Zamudio MD [facility]   6/3/2024  9:45 AM Muriel Benson FNP [facility]

## 2024-03-01 ENCOUNTER — OFFICE VISIT (OUTPATIENT)
Dept: FAMILY MEDICINE | Facility: CLINIC | Age: 34
End: 2024-03-01
Payer: MEDICAID

## 2024-03-01 VITALS
BODY MASS INDEX: 30.26 KG/M2 | HEART RATE: 96 BPM | SYSTOLIC BLOOD PRESSURE: 112 MMHG | DIASTOLIC BLOOD PRESSURE: 78 MMHG | TEMPERATURE: 99 F | WEIGHT: 170.81 LBS

## 2024-03-01 DIAGNOSIS — F32.A DEPRESSION, UNSPECIFIED DEPRESSION TYPE: ICD-10-CM

## 2024-03-01 DIAGNOSIS — Z76.0 MEDICATION REFILL: Primary | ICD-10-CM

## 2024-03-01 PROCEDURE — 3008F BODY MASS INDEX DOCD: CPT | Mod: CPTII,,, | Performed by: NURSE PRACTITIONER

## 2024-03-01 PROCEDURE — 3078F DIAST BP <80 MM HG: CPT | Mod: CPTII,,, | Performed by: NURSE PRACTITIONER

## 2024-03-01 PROCEDURE — 1159F MED LIST DOCD IN RCRD: CPT | Mod: CPTII,,, | Performed by: NURSE PRACTITIONER

## 2024-03-01 PROCEDURE — 99213 OFFICE O/P EST LOW 20 MIN: CPT | Mod: ,,, | Performed by: NURSE PRACTITIONER

## 2024-03-01 PROCEDURE — 3074F SYST BP LT 130 MM HG: CPT | Mod: CPTII,,, | Performed by: NURSE PRACTITIONER

## 2024-03-01 RX ORDER — FLUOXETINE HYDROCHLORIDE 20 MG/1
20 CAPSULE ORAL DAILY
Qty: 30 CAPSULE | Refills: 1 | Status: SHIPPED | OUTPATIENT
Start: 2024-03-01 | End: 2024-03-05 | Stop reason: DRUGHIGH

## 2024-03-05 ENCOUNTER — OFFICE VISIT (OUTPATIENT)
Dept: BEHAVIORAL HEALTH | Facility: CLINIC | Age: 34
End: 2024-03-05
Payer: MEDICAID

## 2024-03-05 VITALS
HEIGHT: 63 IN | BODY MASS INDEX: 30.83 KG/M2 | WEIGHT: 174 LBS | DIASTOLIC BLOOD PRESSURE: 80 MMHG | SYSTOLIC BLOOD PRESSURE: 126 MMHG | TEMPERATURE: 97 F | HEART RATE: 80 BPM

## 2024-03-05 DIAGNOSIS — F51.04 PSYCHOPHYSIOLOGICAL INSOMNIA: Chronic | ICD-10-CM

## 2024-03-05 DIAGNOSIS — R44.3 HALLUCINATIONS: Chronic | ICD-10-CM

## 2024-03-05 DIAGNOSIS — F41.1 GAD (GENERALIZED ANXIETY DISORDER): Chronic | ICD-10-CM

## 2024-03-05 DIAGNOSIS — F33.2 SEVERE EPISODE OF RECURRENT MAJOR DEPRESSIVE DISORDER, WITHOUT PSYCHOTIC FEATURES: Primary | Chronic | ICD-10-CM

## 2024-03-05 PROCEDURE — 1159F MED LIST DOCD IN RCRD: CPT | Mod: CPTII,,, | Performed by: NURSE PRACTITIONER

## 2024-03-05 PROCEDURE — 1160F RVW MEDS BY RX/DR IN RCRD: CPT | Mod: CPTII,,, | Performed by: NURSE PRACTITIONER

## 2024-03-05 PROCEDURE — 99213 OFFICE O/P EST LOW 20 MIN: CPT | Mod: PBBFAC,PN | Performed by: NURSE PRACTITIONER

## 2024-03-05 PROCEDURE — 3008F BODY MASS INDEX DOCD: CPT | Mod: CPTII,,, | Performed by: NURSE PRACTITIONER

## 2024-03-05 PROCEDURE — 3079F DIAST BP 80-89 MM HG: CPT | Mod: CPTII,,, | Performed by: NURSE PRACTITIONER

## 2024-03-05 PROCEDURE — 3074F SYST BP LT 130 MM HG: CPT | Mod: CPTII,,, | Performed by: NURSE PRACTITIONER

## 2024-03-05 PROCEDURE — 99215 OFFICE O/P EST HI 40 MIN: CPT | Mod: SA,HB,S$PBB, | Performed by: NURSE PRACTITIONER

## 2024-03-05 RX ORDER — QUETIAPINE FUMARATE 50 MG/1
50 TABLET, FILM COATED ORAL NIGHTLY
Qty: 30 TABLET | Refills: 3 | Status: SHIPPED | OUTPATIENT
Start: 2024-03-05 | End: 2024-05-20 | Stop reason: ALTCHOICE

## 2024-03-05 RX ORDER — FLUOXETINE HYDROCHLORIDE 40 MG/1
40 CAPSULE ORAL DAILY
Qty: 30 CAPSULE | Refills: 3 | Status: SHIPPED | OUTPATIENT
Start: 2024-03-05 | End: 2024-05-20 | Stop reason: SDUPTHER
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
## 2024-03-28 ENCOUNTER — OFFICE VISIT (OUTPATIENT)
Dept: BEHAVIORAL HEALTH | Facility: CLINIC | Age: 34
End: 2024-03-28
Payer: MEDICAID

## 2024-03-28 VITALS
HEIGHT: 63 IN | TEMPERATURE: 98 F | WEIGHT: 171.63 LBS | DIASTOLIC BLOOD PRESSURE: 76 MMHG | SYSTOLIC BLOOD PRESSURE: 128 MMHG | BODY MASS INDEX: 30.41 KG/M2 | HEART RATE: 70 BPM

## 2024-03-28 DIAGNOSIS — F33.2 SEVERE EPISODE OF RECURRENT MAJOR DEPRESSIVE DISORDER, WITHOUT PSYCHOTIC FEATURES: Primary | Chronic | ICD-10-CM

## 2024-03-28 DIAGNOSIS — F41.1 GAD (GENERALIZED ANXIETY DISORDER): Chronic | ICD-10-CM

## 2024-03-28 DIAGNOSIS — R44.3 HALLUCINATIONS: Chronic | ICD-10-CM

## 2024-03-28 DIAGNOSIS — F32.A DEPRESSION, UNSPECIFIED DEPRESSION TYPE: ICD-10-CM

## 2024-03-28 PROCEDURE — 3008F BODY MASS INDEX DOCD: CPT | Mod: CPTII,,, | Performed by: NURSE PRACTITIONER

## 2024-03-28 PROCEDURE — 99213 OFFICE O/P EST LOW 20 MIN: CPT | Mod: PBBFAC,PN | Performed by: NURSE PRACTITIONER

## 2024-03-28 PROCEDURE — 99213 OFFICE O/P EST LOW 20 MIN: CPT | Mod: SA,HB,S$PBB, | Performed by: NURSE PRACTITIONER

## 2024-03-28 PROCEDURE — 1159F MED LIST DOCD IN RCRD: CPT | Mod: CPTII,,, | Performed by: NURSE PRACTITIONER

## 2024-03-28 PROCEDURE — 3078F DIAST BP <80 MM HG: CPT | Mod: CPTII,,, | Performed by: NURSE PRACTITIONER

## 2024-03-28 PROCEDURE — 3074F SYST BP LT 130 MM HG: CPT | Mod: CPTII,,, | Performed by: NURSE PRACTITIONER
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
## 2024-03-28 NOTE — PATIENT INSTRUCTIONS
Continue Prozac 40mg a day  2. Continue Seroquel 50mg at HS  3. SWLA for dentist   4. FU in 8 weeks

## 2024-05-20 ENCOUNTER — OFFICE VISIT (OUTPATIENT)
Dept: BEHAVIORAL HEALTH | Facility: CLINIC | Age: 34
End: 2024-05-20
Payer: MEDICAID

## 2024-05-20 VITALS
HEART RATE: 70 BPM | WEIGHT: 162.69 LBS | HEIGHT: 63 IN | DIASTOLIC BLOOD PRESSURE: 68 MMHG | BODY MASS INDEX: 28.82 KG/M2 | TEMPERATURE: 98 F | SYSTOLIC BLOOD PRESSURE: 116 MMHG

## 2024-05-20 DIAGNOSIS — F41.1 GAD (GENERALIZED ANXIETY DISORDER): Chronic | ICD-10-CM

## 2024-05-20 DIAGNOSIS — R44.3 HALLUCINATIONS: Chronic | ICD-10-CM

## 2024-05-20 DIAGNOSIS — F33.2 SEVERE EPISODE OF RECURRENT MAJOR DEPRESSIVE DISORDER, WITHOUT PSYCHOTIC FEATURES: Chronic | ICD-10-CM

## 2024-05-20 PROCEDURE — 3078F DIAST BP <80 MM HG: CPT | Mod: CPTII,,, | Performed by: NURSE PRACTITIONER

## 2024-05-20 PROCEDURE — 99213 OFFICE O/P EST LOW 20 MIN: CPT | Mod: SA,HB,S$PBB, | Performed by: NURSE PRACTITIONER

## 2024-05-20 PROCEDURE — 1160F RVW MEDS BY RX/DR IN RCRD: CPT | Mod: CPTII,,, | Performed by: NURSE PRACTITIONER

## 2024-05-20 PROCEDURE — 3008F BODY MASS INDEX DOCD: CPT | Mod: CPTII,,, | Performed by: NURSE PRACTITIONER

## 2024-05-20 PROCEDURE — 3074F SYST BP LT 130 MM HG: CPT | Mod: CPTII,,, | Performed by: NURSE PRACTITIONER

## 2024-05-20 PROCEDURE — 1159F MED LIST DOCD IN RCRD: CPT | Mod: CPTII,,, | Performed by: NURSE PRACTITIONER

## 2024-05-20 PROCEDURE — 99213 OFFICE O/P EST LOW 20 MIN: CPT | Mod: PBBFAC,PN | Performed by: NURSE PRACTITIONER

## 2024-05-20 RX ORDER — LURASIDONE HYDROCHLORIDE 40 MG/1
40 TABLET, FILM COATED ORAL DAILY
Qty: 30 TABLET | Refills: 3 | Status: SHIPPED | OUTPATIENT
Start: 2024-05-20

## 2024-05-20 RX ORDER — FLUOXETINE HYDROCHLORIDE 40 MG/1
40 CAPSULE ORAL DAILY
Qty: 30 CAPSULE | Refills: 3 | Status: SHIPPED | OUTPATIENT
Start: 2024-05-20

## 2024-05-20 NOTE — PATIENT INSTRUCTIONS
Plan:  Continue Prozac  40mg a day  2. Discontinue Seroquel 100mg at HS  3. Start Latuda 40mg at night - be sure to eat at least 350calories with the medication  4. DANIELLA for dentist  5. FU in 8 weeks
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
## 2024-05-30 ENCOUNTER — LAB VISIT (OUTPATIENT)
Dept: LAB | Facility: HOSPITAL | Age: 34
End: 2024-05-30
Attending: NURSE PRACTITIONER
Payer: MEDICAID

## 2024-05-30 DIAGNOSIS — F32.A DEPRESSION, UNSPECIFIED DEPRESSION TYPE: ICD-10-CM

## 2024-05-30 LAB
25(OH)D3+25(OH)D2 SERPL-MCNC: 24 NG/ML (ref 30–80)
ALBUMIN SERPL-MCNC: 3.3 G/DL (ref 3.5–5)
ALBUMIN/GLOB SERPL: 0.9 RATIO (ref 1.1–2)
ALP SERPL-CCNC: 106 UNIT/L (ref 40–150)
ALT SERPL-CCNC: 11 UNIT/L (ref 0–55)
ANION GAP SERPL CALC-SCNC: 4 MEQ/L
AST SERPL-CCNC: 13 UNIT/L (ref 5–34)
BASOPHILS # BLD AUTO: 0.05 X10(3)/MCL
BASOPHILS NFR BLD AUTO: 0.9 %
BILIRUB SERPL-MCNC: 0.5 MG/DL
BUN SERPL-MCNC: 7 MG/DL (ref 7–18.7)
CALCIUM SERPL-MCNC: 9.2 MG/DL (ref 8.4–10.2)
CHLORIDE SERPL-SCNC: 112 MMOL/L (ref 98–107)
CHOLEST SERPL-MCNC: 193 MG/DL
CHOLEST/HDLC SERPL: 6 {RATIO} (ref 0–5)
CO2 SERPL-SCNC: 25 MMOL/L (ref 22–29)
CREAT SERPL-MCNC: 0.8 MG/DL (ref 0.55–1.02)
CREAT/UREA NIT SERPL: 9
EOSINOPHIL # BLD AUTO: 0.1 X10(3)/MCL (ref 0–0.9)
EOSINOPHIL NFR BLD AUTO: 1.9 %
ERYTHROCYTE [DISTWIDTH] IN BLOOD BY AUTOMATED COUNT: 12.9 % (ref 11.5–17)
EST. AVERAGE GLUCOSE BLD GHB EST-MCNC: 96.8 MG/DL
GFR SERPLBLD CREATININE-BSD FMLA CKD-EPI: >60 ML/MIN/1.73/M2
GLOBULIN SER-MCNC: 3.6 GM/DL (ref 2.4–3.5)
GLUCOSE SERPL-MCNC: 86 MG/DL (ref 74–100)
HBA1C MFR BLD: 5 %
HCT VFR BLD AUTO: 42.4 % (ref 37–47)
HDLC SERPL-MCNC: 33 MG/DL (ref 35–60)
HGB BLD-MCNC: 14.1 G/DL (ref 12–16)
IMM GRANULOCYTES # BLD AUTO: 0.01 X10(3)/MCL (ref 0–0.04)
IMM GRANULOCYTES NFR BLD AUTO: 0.2 %
LDLC SERPL CALC-MCNC: 145 MG/DL (ref 50–140)
LYMPHOCYTES # BLD AUTO: 2.05 X10(3)/MCL (ref 0.6–4.6)
LYMPHOCYTES NFR BLD AUTO: 38.2 %
MCH RBC QN AUTO: 29.7 PG (ref 27–31)
MCHC RBC AUTO-ENTMCNC: 33.3 G/DL (ref 33–36)
MCV RBC AUTO: 89.3 FL (ref 80–94)
MONOCYTES # BLD AUTO: 0.45 X10(3)/MCL (ref 0.1–1.3)
MONOCYTES NFR BLD AUTO: 8.4 %
NEUTROPHILS # BLD AUTO: 2.71 X10(3)/MCL (ref 2.1–9.2)
NEUTROPHILS NFR BLD AUTO: 50.4 %
PLATELET # BLD AUTO: 248 X10(3)/MCL (ref 130–400)
PMV BLD AUTO: 9.4 FL (ref 7.4–10.4)
POTASSIUM SERPL-SCNC: 3.9 MMOL/L (ref 3.5–5.1)
PROT SERPL-MCNC: 6.9 GM/DL (ref 6.4–8.3)
RBC # BLD AUTO: 4.75 X10(6)/MCL (ref 4.2–5.4)
SODIUM SERPL-SCNC: 141 MMOL/L (ref 136–145)
TRIGL SERPL-MCNC: 77 MG/DL (ref 37–140)
TSH SERPL-ACNC: 0.88 UIU/ML (ref 0.35–4.94)
VLDLC SERPL CALC-MCNC: 15 MG/DL
WBC # SPEC AUTO: 5.37 X10(3)/MCL (ref 4.5–11.5)

## 2024-05-30 PROCEDURE — 83036 HEMOGLOBIN GLYCOSYLATED A1C: CPT

## 2024-05-30 PROCEDURE — 85025 COMPLETE CBC W/AUTO DIFF WBC: CPT

## 2024-05-30 PROCEDURE — 84443 ASSAY THYROID STIM HORMONE: CPT

## 2024-05-30 PROCEDURE — 36415 COLL VENOUS BLD VENIPUNCTURE: CPT

## 2024-05-30 PROCEDURE — 82306 VITAMIN D 25 HYDROXY: CPT

## 2024-05-30 PROCEDURE — 80053 COMPREHEN METABOLIC PANEL: CPT

## 2024-05-30 PROCEDURE — 80061 LIPID PANEL: CPT

## 2024-06-03 ENCOUNTER — OFFICE VISIT (OUTPATIENT)
Dept: FAMILY MEDICINE | Facility: CLINIC | Age: 34
End: 2024-06-03
Payer: MEDICAID

## 2024-06-03 VITALS
RESPIRATION RATE: 17 BRPM | OXYGEN SATURATION: 99 % | HEART RATE: 93 BPM | WEIGHT: 179 LBS | TEMPERATURE: 98 F | DIASTOLIC BLOOD PRESSURE: 83 MMHG | HEIGHT: 63 IN | BODY MASS INDEX: 31.71 KG/M2 | SYSTOLIC BLOOD PRESSURE: 116 MMHG

## 2024-06-03 DIAGNOSIS — Z00.00 WELLNESS EXAMINATION: Primary | ICD-10-CM

## 2024-06-03 DIAGNOSIS — M62.838 MUSCLE SPASM: ICD-10-CM

## 2024-06-03 DIAGNOSIS — F32.A DEPRESSION, UNSPECIFIED DEPRESSION TYPE: ICD-10-CM

## 2024-06-03 PROCEDURE — 3079F DIAST BP 80-89 MM HG: CPT | Mod: CPTII,,, | Performed by: NURSE PRACTITIONER

## 2024-06-03 PROCEDURE — 3074F SYST BP LT 130 MM HG: CPT | Mod: CPTII,,, | Performed by: NURSE PRACTITIONER

## 2024-06-03 PROCEDURE — 99395 PREV VISIT EST AGE 18-39: CPT | Mod: ,,, | Performed by: NURSE PRACTITIONER

## 2024-06-03 PROCEDURE — 3008F BODY MASS INDEX DOCD: CPT | Mod: CPTII,,, | Performed by: NURSE PRACTITIONER

## 2024-06-03 PROCEDURE — 1159F MED LIST DOCD IN RCRD: CPT | Mod: CPTII,,, | Performed by: NURSE PRACTITIONER

## 2024-06-03 PROCEDURE — 3044F HG A1C LEVEL LT 7.0%: CPT | Mod: CPTII,,, | Performed by: NURSE PRACTITIONER

## 2024-06-03 RX ORDER — METHOCARBAMOL 500 MG/1
500 TABLET, FILM COATED ORAL NIGHTLY PRN
Qty: 20 TABLET | Refills: 0 | Status: SHIPPED | OUTPATIENT
Start: 2024-06-03

## 2024-06-03 RX ORDER — FLUTICASONE PROPIONATE 50 MCG
1 SPRAY, SUSPENSION (ML) NASAL DAILY
COMMUNITY
Start: 2024-05-20

## 2024-06-03 RX ORDER — CETIRIZINE HYDROCHLORIDE 10 MG/1
10 TABLET ORAL DAILY
COMMUNITY
Start: 2024-05-14

## 2024-06-03 NOTE — PATIENT INSTRUCTIONS
Evert Ballesteros,     If you are due for any health screening(s) below please notify me so we can arrange them to be ordered and scheduled. Most healthy patients at your age complete them, but you are free to accept or refuse.     If you can't do it, I'll definitely understand. If you can, I'd certainly appreciate it!    All of your core healthy metrics are met.

## 2024-06-03 NOTE — PROGRESS NOTES
SUBJECTIVE:     History of Present Illness      Chief Complaint: Annual Exam (Here for wellness visit.  Discuss lab results. C/O pain between shoulder blades since Wednesday, comes and goes, sharp pain, worse in the morning, no pattern.)    HPI:  Patient is a 34 y.o. year old female who presents to clinic for annual wellness and lab review.  The patient's general health status described as fair.  Patient does follow up with mental health.  Denies SI, HI hallucinations.  Patient was started on Latuda and Seroquel discontinue for possible muscle spasm tightness her neck per mental health provider.  Smoking status none.    Review of Systems:    Review of Systems    12 point review of systems conducted, negative except as stated in the history of present illness. See HPI for details.     Previous History    Muriel Benson, [facility]  Review of patient's allergies indicates:   Allergen Reactions    Ibuprofen Hives    Shellfish containing products      Other Reaction(s): Unknown    Tramadol      Other Reaction(s): Unknown       Past Medical History:   Diagnosis Date    Anxiety     Depression 09/01/2023     Current Outpatient Medications   Medication Instructions    cetirizine (ZYRTEC) 10 mg, Oral, Daily    FLUoxetine 40 mg, Oral, Daily    fluticasone propionate (FLONASE) 50 mcg/actuation nasal spray 1 spray, Each Nostril, Daily    lurasidone (LATUDA) 40 mg, Oral, Daily    medroxyPROGESTERone (DEPO-PROVERA) 150 mg, Intramuscular, Every 3 months    medroxyPROGESTERone (DEPO-PROVERA) 150 mg, Intramuscular, Every 3 months    methocarbamoL (ROBAXIN) 500 mg, Oral, Nightly PRN     Past Surgical History:   Procedure Laterality Date    CYST REMOVAL      behind right ear    TUBAL LIGATION       Family History   Problem Relation Name Age of Onset    Hypertension Mother      Hypertension Father         Social History     Tobacco Use    Smoking status: Never     Passive exposure: Never    Smokeless tobacco: Never   Substance Use Topics  "   Alcohol use: Not Currently    Drug use: Never        Health Maintenance      Health Maintenance   Topic Date Due    TETANUS VACCINE  06/04/2031    Hepatitis C Screening  Completed    Lipid Panel  Completed       OBJECTIVE:     Physical Exam      Vital Signs Reviewed   Visit Vitals  /83 (BP Location: Left arm, Patient Position: Sitting)   Pulse 93   Temp 98.3 °F (36.8 °C) (Oral)   Resp 17   Ht 5' 3" (1.6 m)   Wt 81.2 kg (179 lb)   SpO2 99%   BMI 31.71 kg/m²       Physical Exam    Physical Exam:  General: Alert, well nourished, no acute distress, non-toxic appearing.   Eyes: Anicteric sclera, without conjunctival injection, normal lids, no purulent drainage, EOMs grossly intact.   Ears: No tragal tenderness. Tympanic membranes intact, pearly grey, without effusion or erythema and with a positive light reflex.   Mouth: Posterior pharynx without erythema. No exudate, ulcerations, or lesion. No tonsillar swelling.   Neck: Supple, full ROM, no rigidity, no cervical adenopathy.   Cardio: Normal rate and rhythm    Resp: Respirations even and unlabored, clear to auscultation bilaterally.   Abd: No ecchymosis or distension. Normal bowel sounds in all 4 quadrants. No tenderness to palpation. No rebound tenderness or guarding. No CVA tenderness.   Skin: No rashes or open lesions noted.   MSK: No swelling. No abrasions or signs of trauma. Ambulating without assistance.   Neuro: Alert,oriented No focal deficits noted. Facial expressions even.   Psych: Cooperative, Normal affect      Procedures    Procedures     Labs     Results for orders placed or performed in visit on 05/30/24   Comprehensive Metabolic Panel   Result Value Ref Range    Sodium 141 136 - 145 mmol/L    Potassium 3.9 3.5 - 5.1 mmol/L    Chloride 112 (H) 98 - 107 mmol/L    CO2 25 22 - 29 mmol/L    Glucose 86 74 - 100 mg/dL    Blood Urea Nitrogen 7.0 7.0 - 18.7 mg/dL    Creatinine 0.80 0.55 - 1.02 mg/dL    Calcium 9.2 8.4 - 10.2 mg/dL    Protein Total 6.9 " 6.4 - 8.3 gm/dL    Albumin 3.3 (L) 3.5 - 5.0 g/dL    Globulin 3.6 (H) 2.4 - 3.5 gm/dL    Albumin/Globulin Ratio 0.9 (L) 1.1 - 2.0 ratio    Bilirubin Total 0.5 <=1.5 mg/dL     40 - 150 unit/L    ALT 11 0 - 55 unit/L    AST 13 5 - 34 unit/L    eGFR >60 mL/min/1.73/m2    Anion Gap 4.0 mEq/L    BUN/Creatinine Ratio 9    Lipid Panel   Result Value Ref Range    Cholesterol Total 193 <=200 mg/dL    HDL Cholesterol 33 (L) 35 - 60 mg/dL    Triglyceride 77 37 - 140 mg/dL    Cholesterol/HDL Ratio 6 (H) 0 - 5    Very Low Density Lipoprotein 15     LDL Cholesterol 145.00 (H) 50.00 - 140.00 mg/dL   TSH   Result Value Ref Range    TSH 0.884 0.350 - 4.940 uIU/mL   Hemoglobin A1C   Result Value Ref Range    Hemoglobin A1c 5.0 <=7.0 %    Estimated Average Glucose 96.8 mg/dL   Vitamin D   Result Value Ref Range    Vitamin D 24 (L) 30 - 80 ng/mL   CBC with Differential   Result Value Ref Range    WBC 5.37 4.50 - 11.50 x10(3)/mcL    RBC 4.75 4.20 - 5.40 x10(6)/mcL    Hgb 14.1 12.0 - 16.0 g/dL    Hct 42.4 37.0 - 47.0 %    MCV 89.3 80.0 - 94.0 fL    MCH 29.7 27.0 - 31.0 pg    MCHC 33.3 33.0 - 36.0 g/dL    RDW 12.9 11.5 - 17.0 %    Platelet 248 130 - 400 x10(3)/mcL    MPV 9.4 7.4 - 10.4 fL    Neut % 50.4 %    Lymph % 38.2 %    Mono % 8.4 %    Eos % 1.9 %    Basophil % 0.9 %    Lymph # 2.05 0.6 - 4.6 x10(3)/mcL    Neut # 2.71 2.1 - 9.2 x10(3)/mcL    Mono # 0.45 0.1 - 1.3 x10(3)/mcL    Eos # 0.10 0 - 0.9 x10(3)/mcL    Baso # 0.05 <=0.2 x10(3)/mcL    IG# 0.01 0 - 0.04 x10(3)/mcL    IG% 0.2 %       Chemistry:  Lab Results   Component Value Date     05/30/2024    K 3.9 05/30/2024    BUN 7.0 05/30/2024    CREATININE 0.80 05/30/2024    EGFRNORACEVR >60 05/30/2024    GLUCOSE 86 05/30/2024    CALCIUM 9.2 05/30/2024    ALKPHOS 106 05/30/2024    LABPROT 6.9 05/30/2024    ALBUMIN 3.3 (L) 05/30/2024    BILIDIR 0.00 08/01/2018    IBILI 0.10 08/01/2018    AST 13 05/30/2024    ALT 11 05/30/2024    MG 1.90 04/13/2023    [id number] 24 (L)  05/30/2024    TSH 0.884 05/30/2024        Lab Results   Component Value Date    HGBA1C 5.0 05/30/2024        Hematology:  Lab Results   Component Value Date    WBC 5.37 05/30/2024    HGB 14.1 05/30/2024    HCT 42.4 05/30/2024     05/30/2024       Lipid Panel:  Lab Results   Component Value Date    CHOL 193 05/30/2024    HDL 33 (L) 05/30/2024    .00 (H) 05/30/2024    TRIG 77 05/30/2024    TOTALCHOLEST 6 (H) 05/30/2024        Urine:  Lab Results   Component Value Date    APPEARANCEUA Clear 05/29/2023    SGUA 1.025 05/29/2023    PROTEINUA Negative 05/29/2023    KETONESUA Negative 05/29/2023    LEUKOCYTESUR Negative 05/29/2023    RBCUA 6-10 (A) 05/29/2023    WBCUA None Seen 05/29/2023    BACTERIA Few (A) 05/29/2023         Assessment            ICD-10-CM ICD-9-CM   1. Wellness examination  Z00.00 V70.0   2. Muscle spasm  M62.838 728.85   3. Depression, unspecified depression type  F32.A 311       Plan       1. Wellness examination  Discussed labs and preventative screenings   Overall health status reviewed.    Significant chronic conditions addressed, including ongoing treatment plans.   Good health habits reinforced.    Cardiovascular disease risk factors discussed.   Appropriate recommendations and preventative care medical   information provided with annual wellness exams encouraged.  Vaccination status     Cervical - UTD     2. Muscle spasm  - methocarbamoL (ROBAXIN) 500 MG Tab; Take 1 tablet (500 mg total) by mouth nightly as needed (back pain).  Dispense: 20 tablet; Refill: 0    3. Depression, unspecified depression type  Stable continue current medication daily as prescribed   Denies SI, HI hallucinations.    Establish good family/social support, reading, meditation, physical activity.  ED precautions discussed    Orders Placed This Encounter    methocarbamoL (ROBAXIN) 500 MG Tab      Medication List with Changes/Refills   New Medications    METHOCARBAMOL (ROBAXIN) 500 MG TAB    Take 1 tablet (500  mg total) by mouth nightly as needed (back pain).   Current Medications    CETIRIZINE (ZYRTEC) 10 MG TABLET    Take 10 mg by mouth once daily.    FLUOXETINE 40 MG CAPSULE    Take 1 capsule (40 mg total) by mouth once daily.    FLUTICASONE PROPIONATE (FLONASE) 50 MCG/ACTUATION NASAL SPRAY    1 spray by Each Nostril route Daily.    LURASIDONE (LATUDA) 40 MG TAB TABLET    Take 1 tablet (40 mg total) by mouth once daily.    MEDROXYPROGESTERONE (DEPO-PROVERA) 150 MG/ML SYRG    Inject 1 mL (150 mg total) into the muscle every 3 (three) months.    MEDROXYPROGESTERONE (DEPO-PROVERA) 150 MG/ML SYRG    Inject 1 mL (150 mg total) into the muscle every 3 (three) months.       Follow up in about 4 months (around 10/3/2024), or if symptoms worsen or fail to improve.   Follow up in about 4 months (around 10/3/2024), or if symptoms worsen or fail to improve. In addition to their scheduled follow up, the patient has also been instructed to follow up on as needed basis.   Future Appointments   Date Time Provider Department Center   7/17/2024  9:30 AM Keven Zamudio MD [facility]   7/30/2024  1:30 PM [facility] Escobedo [facility]   10/3/2024  9:30 AM Muriel Benson FNP [facility]   6/5/2025  9:30 AM Muriel Benson FNP [facility]

## 2024-07-30 ENCOUNTER — OFFICE VISIT (OUTPATIENT)
Dept: BEHAVIORAL HEALTH | Facility: CLINIC | Age: 34
End: 2024-07-30
Payer: MEDICAID

## 2024-07-30 VITALS
BODY MASS INDEX: 33.35 KG/M2 | SYSTOLIC BLOOD PRESSURE: 113 MMHG | DIASTOLIC BLOOD PRESSURE: 77 MMHG | HEART RATE: 72 BPM | HEIGHT: 64 IN | WEIGHT: 195.38 LBS

## 2024-07-30 DIAGNOSIS — F20.3 UNDIFFERENTIATED SCHIZOPHRENIA: Chronic | ICD-10-CM

## 2024-07-30 DIAGNOSIS — F33.2 SEVERE EPISODE OF RECURRENT MAJOR DEPRESSIVE DISORDER, WITHOUT PSYCHOTIC FEATURES: Primary | Chronic | ICD-10-CM

## 2024-07-30 DIAGNOSIS — R44.3 HALLUCINATIONS: Chronic | ICD-10-CM

## 2024-07-30 DIAGNOSIS — F51.04 PSYCHOPHYSIOLOGICAL INSOMNIA: Chronic | ICD-10-CM

## 2024-07-30 DIAGNOSIS — F41.1 GAD (GENERALIZED ANXIETY DISORDER): Chronic | ICD-10-CM

## 2024-07-30 PROCEDURE — 99214 OFFICE O/P EST MOD 30 MIN: CPT | Mod: S$PBB,,, | Performed by: NURSE PRACTITIONER

## 2024-07-30 PROCEDURE — 3074F SYST BP LT 130 MM HG: CPT | Mod: CPTII,,, | Performed by: NURSE PRACTITIONER

## 2024-07-30 PROCEDURE — 3044F HG A1C LEVEL LT 7.0%: CPT | Mod: CPTII,,, | Performed by: NURSE PRACTITIONER

## 2024-07-30 PROCEDURE — 1159F MED LIST DOCD IN RCRD: CPT | Mod: CPTII,,, | Performed by: NURSE PRACTITIONER

## 2024-07-30 PROCEDURE — 3078F DIAST BP <80 MM HG: CPT | Mod: CPTII,,, | Performed by: NURSE PRACTITIONER

## 2024-07-30 PROCEDURE — 3008F BODY MASS INDEX DOCD: CPT | Mod: CPTII,,, | Performed by: NURSE PRACTITIONER

## 2024-07-30 PROCEDURE — 99213 OFFICE O/P EST LOW 20 MIN: CPT | Mod: PBBFAC,PN | Performed by: NURSE PRACTITIONER

## 2024-07-30 RX ORDER — FLUOXETINE HYDROCHLORIDE 40 MG/1
40 CAPSULE ORAL DAILY
Qty: 30 CAPSULE | Refills: 3 | Status: SHIPPED | OUTPATIENT
Start: 2024-07-30

## 2024-07-30 RX ORDER — BUSPIRONE HYDROCHLORIDE 5 MG/1
5 TABLET ORAL 3 TIMES DAILY
Qty: 90 TABLET | Refills: 3 | Status: SHIPPED | OUTPATIENT
Start: 2024-07-30

## 2024-07-30 RX ORDER — LURASIDONE HYDROCHLORIDE 60 MG/1
TABLET, FILM COATED ORAL
Qty: 30 TABLET | Refills: 3 | Status: SHIPPED | OUTPATIENT
Start: 2024-07-30
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
## 2024-07-30 NOTE — PATIENT INSTRUCTIONS
Plan:  Continue Prozac  40mg a day  2. Increase Latuda to 60mg at night - be sure to eat at least 350calories with the medication  3. Start Buspar 5mg three times a day for anxiety  4. ADENIKE for dentist  [address] [place] [id number] [phone]  5. FU in 3 months

## 2024-09-26 ENCOUNTER — TELEPHONE (OUTPATIENT)
Dept: FAMILY MEDICINE | Facility: CLINIC | Age: 34
End: 2024-09-26
Payer: MEDICAID

## 2024-10-10 ENCOUNTER — OFFICE VISIT (OUTPATIENT)
Dept: FAMILY MEDICINE | Facility: CLINIC | Age: 34
End: 2024-10-10
Payer: MEDICAID

## 2024-10-10 VITALS
TEMPERATURE: 98 F | HEIGHT: 63 IN | HEART RATE: 84 BPM | DIASTOLIC BLOOD PRESSURE: 76 MMHG | BODY MASS INDEX: 33.11 KG/M2 | SYSTOLIC BLOOD PRESSURE: 109 MMHG | WEIGHT: 186.88 LBS | RESPIRATION RATE: 17 BRPM | OXYGEN SATURATION: 99 %

## 2024-10-10 DIAGNOSIS — R51.9 ACUTE NONINTRACTABLE HEADACHE, UNSPECIFIED HEADACHE TYPE: Primary | ICD-10-CM

## 2024-10-10 DIAGNOSIS — H66.92 LEFT OTITIS MEDIA, UNSPECIFIED OTITIS MEDIA TYPE: ICD-10-CM

## 2024-10-10 DIAGNOSIS — F32.A DEPRESSION, UNSPECIFIED DEPRESSION TYPE: ICD-10-CM

## 2024-10-10 RX ORDER — BUTALBITAL, ACETAMINOPHEN AND CAFFEINE 50; 325; 40 MG/1; MG/1; MG/1
1 TABLET ORAL EVERY 4 HOURS PRN
Qty: 12 TABLET | Refills: 0 | Status: SHIPPED | OUTPATIENT
Start: 2024-10-10 | End: 2024-11-09

## 2024-10-10 RX ORDER — LURASIDONE HYDROCHLORIDE 40 MG/1
40 TABLET, FILM COATED ORAL DAILY
COMMUNITY
Start: 2024-09-04

## 2024-10-10 RX ORDER — TRIAMCINOLONE ACETONIDE 5 MG/G
CREAM TOPICAL 2 TIMES DAILY
COMMUNITY
Start: 2024-10-09

## 2024-10-10 RX ORDER — AMOXICILLIN AND CLAVULANATE POTASSIUM 875; 125 MG/1; MG/1
1 TABLET, FILM COATED ORAL EVERY 12 HOURS
Qty: 14 TABLET | Refills: 0 | Status: SHIPPED | OUTPATIENT
Start: 2024-10-10

## 2024-10-10 NOTE — PROGRESS NOTES
SUBJECTIVE:     History of Present Illness      Chief Complaint: Follow-up (4 month follow up visit for depression, seeing ANSON Escobedo.  Patient states same, no improvement.  Medications adjusted.  Patient feels sad daily (people make her feel this way), not suicidal.  C/o migraine since last Thursday with dizziness.)    HPI:  Patient is a 34 y.o. year old female who presents to clinic for four-month follow-up.  Patient reports that she has has a medication changes with a mental health provider.  States some days she still suffers with some depression.  Denies SI, HI hallucinations.  Patient complains of migraine for the past 7 days and pain to her left ear.    Review of Systems:    Review of Systems    12 point review of systems conducted, negative except as stated in the history of present illness. See HPI for details.     Previous History    Muriel Benson, [facility]  Review of patient's allergies indicates:   Allergen Reactions    Ibuprofen Hives    Shellfish containing products      Other Reaction(s): Unknown    Tramadol      Other Reaction(s): Unknown       Past Medical History:   Diagnosis Date    Anxiety     Depression 09/01/2023    Schizophrenia      Current Outpatient Medications   Medication Instructions    amoxicillin-clavulanate 875-125mg (AUGMENTIN) 875-125 mg per tablet 1 tablet, Oral, Every 12 hours    busPIRone (BUSPAR) 5 mg, Oral, 3 times daily    butalbital-acetaminophen-caffeine -40 mg (FIORICET, ESGIC) -40 mg per tablet 1 tablet, Oral, Every 4 hours PRN    cetirizine (ZYRTEC) 10 mg, Daily    FLUoxetine 40 mg, Oral, Daily    fluticasone propionate (FLONASE) 50 mcg/actuation nasal spray 1 spray, Daily    lurasidone (LATUDA) 60 mg Tab tablet Take Latuda with food.    lurasidone (LATUDA) 40 mg, Daily    medroxyPROGESTERone (DEPO-PROVERA) 150 mg, Intramuscular, Every 3 months    methocarbamoL (ROBAXIN) 500 mg, Oral, Nightly PRN    triamcinolone acetonide 0.5% (KENALOG) 0.5 % Crea 2 times  "daily     Past Surgical History:   Procedure Laterality Date    CYST REMOVAL      behind right ear    TUBAL LIGATION       Family History   Problem Relation Name Age of Onset    Hypertension Mother      Hypertension Father         Social History     Tobacco Use    Smoking status: Never     Passive exposure: Never    Smokeless tobacco: Never   Substance Use Topics    Alcohol use: Not Currently    Drug use: Never        Health Maintenance      Health Maintenance   Topic Date Due    TETANUS VACCINE  06/04/2031    Hepatitis C Screening  Completed    Lipid Panel  Completed       OBJECTIVE:     Physical Exam      Vital Signs Reviewed   Visit Vitals  /76   Pulse 84   Temp 98.4 °F (36.9 °C) (Oral)   Resp 17   Ht 5' 3" (1.6 m)   Wt 84.8 kg (186 lb 14.4 oz)   SpO2 99%   BMI 33.11 kg/m²       Physical Exam  HENT:      Left Ear: A middle ear effusion is present.         Physical Exam:  General: Alert, well nourished, no acute distress, non-toxic appearing.   Eyes: Anicteric sclera, without conjunctival injection, normal lids, no purulent drainage, EOMs grossly intact.   Ears: No tragal tenderness. Tympanic membranes intact, pearly grey, without effusion or erythema and with a positive light reflex.   Mouth: Posterior pharynx without erythema. No exudate, ulcerations, or lesion. No tonsillar swelling.   Neck: Supple, full ROM, no rigidity, no cervical adenopathy.   Cardio: Normal rate and rhythm    Resp: Respirations even and unlabored, clear to auscultation bilaterally.   Abd: No ecchymosis or distension. Normal bowel sounds in all 4 quadrants. No tenderness to palpation. No rebound tenderness or guarding. No CVA tenderness.   Skin: No rashes or open lesions noted.   MSK: No swelling. No abrasions or signs of trauma. Ambulating without assistance.   Neuro: Alert,oriented No focal deficits noted. Facial expressions even.   Psych: Cooperative, Normal affect      Procedures    Procedures     Labs     Results for orders " placed or performed in visit on 07/17/24   POCT urine pregnancy    Collection Time: 07/17/24  9:19 AM   Result Value Ref Range    POC Preg Test, Ur Negative Negative     Acceptable Yes        Chemistry:  Lab Results   Component Value Date     05/30/2024    K 3.9 05/30/2024    BUN 7.0 05/30/2024    CREATININE 0.80 05/30/2024    EGFRNORACEVR >60 05/30/2024    GLUCOSE 86 05/30/2024    CALCIUM 9.2 05/30/2024    ALKPHOS 106 05/30/2024    LABPROT 6.9 05/30/2024    ALBUMIN 3.3 (L) 05/30/2024    BILIDIR 0.00 08/01/2018    IBILI 0.10 08/01/2018    AST 13 05/30/2024    ALT 11 05/30/2024    MG 1.90 04/13/2023    AANBKNSX02HS 24 (L) 05/30/2024    TSH 0.884 05/30/2024        Lab Results   Component Value Date    HGBA1C 5.0 05/30/2024        Hematology:  Lab Results   Component Value Date    WBC 5.37 05/30/2024    HGB 14.1 05/30/2024    HCT 42.4 05/30/2024     05/30/2024       Lipid Panel:  Lab Results   Component Value Date    CHOL 193 05/30/2024    HDL 33 (L) 05/30/2024    .00 (H) 05/30/2024    TRIG 77 05/30/2024    TOTALCHOLEST 6 (H) 05/30/2024        Urine:  Lab Results   Component Value Date    APPEARANCEUA Clear 05/29/2023    SGUA 1.025 05/29/2023    PROTEINUA Negative 05/29/2023    KETONESUA Negative 05/29/2023    LEUKOCYTESUR Negative 05/29/2023    RBCUA 6-10 (A) 05/29/2023    WBCUA None Seen 05/29/2023    BACTERIA Few (A) 05/29/2023         Assessment            ICD-10-CM ICD-9-CM   1. Acute nonintractable headache, unspecified headache type  R51.9 784.0   2. Depression, unspecified depression type  F32.A 311   3. Left otitis media, unspecified otitis media type  H66.92 382.9       Plan       1. Acute nonintractable headache, unspecified headache type  - butalbital-acetaminophen-caffeine -40 mg (FIORICET, ESGIC) -40 mg per tablet; Take 1 tablet by mouth every 4 (four) hours as needed for Headaches.  Dispense: 12 tablet; Refill: 0    2. Depression, unspecified depression  type  Stable continue current medication Latuda, BuSpar per mental health provider.  Denies SI, HI hallucinations.    Establish good family/social support, reading, meditation, physical activity.  ED precautions discussed    3. Left otitis media, unspecified otitis media type  - amoxicillin-clavulanate 875-125mg (AUGMENTIN) 875-125 mg per tablet; Take 1 tablet by mouth every 12 (twelve) hours.  Dispense: 14 tablet; Refill: 0    Orders Placed This Encounter    butalbital-acetaminophen-caffeine -40 mg (FIORICET, ESGIC) -40 mg per tablet    amoxicillin-clavulanate 875-125mg (AUGMENTIN) 875-125 mg per tablet      Medication List with Changes/Refills   New Medications    AMOXICILLIN-CLAVULANATE 875-125MG (AUGMENTIN) 875-125 MG PER TABLET    Take 1 tablet by mouth every 12 (twelve) hours.    BUTALBITAL-ACETAMINOPHEN-CAFFEINE -40 MG (FIORICET, ESGIC) -40 MG PER TABLET    Take 1 tablet by mouth every 4 (four) hours as needed for Headaches.   Current Medications    BUSPIRONE (BUSPAR) 5 MG TAB    Take 1 tablet (5 mg total) by mouth 3 (three) times daily.    CETIRIZINE (ZYRTEC) 10 MG TABLET    Take 10 mg by mouth once daily.    FLUOXETINE 40 MG CAPSULE    Take 1 capsule (40 mg total) by mouth once daily.    FLUTICASONE PROPIONATE (FLONASE) 50 MCG/ACTUATION NASAL SPRAY    1 spray by Each Nostril route Daily.    LURASIDONE (LATUDA) 40 MG TAB TABLET    Take 40 mg by mouth once daily.    LURASIDONE (LATUDA) 60 MG TAB TABLET    Take Latuda with food.    MEDROXYPROGESTERONE (DEPO-PROVERA) 150 MG/ML SYRG    Inject 1 mL (150 mg total) into the muscle every 3 (three) months.    METHOCARBAMOL (ROBAXIN) 500 MG TAB    Take 1 tablet (500 mg total) by mouth nightly as needed (back pain).    TRIAMCINOLONE ACETONIDE 0.5% (KENALOG) 0.5 % CREA    Apply topically 2 (two) times daily.   Discontinued Medications    MEDROXYPROGESTERONE (DEPO-PROVERA) 150 MG/ML SYRG    Inject 1 mL (150 mg total) into the muscle every 3  (three) months.       Follow up in about 4 months (around 2/11/2025).   Follow up in about 4 months (around 2/11/2025). In addition to their scheduled follow up, the patient has also been instructed to follow up on as needed basis.   Future Appointments   Date Time Provider Department Center   10/17/2024  9:00 AM Keven Zamudio MD [facility]   10/31/2024  2:00 PM Kimberly Escobedo APRN [facility]   2/11/2025  9:15 AM Muriel Benson FNP [facility]   6/5/2025  9:30 [facility]

## 2024-10-31 ENCOUNTER — OFFICE VISIT (OUTPATIENT)
Dept: BEHAVIORAL HEALTH | Facility: CLINIC | Age: 34
End: 2024-10-31
Payer: MEDICAID

## 2024-10-31 VITALS
BODY MASS INDEX: 31.3 KG/M2 | WEIGHT: 183.31 LBS | SYSTOLIC BLOOD PRESSURE: 130 MMHG | HEART RATE: 78 BPM | DIASTOLIC BLOOD PRESSURE: 85 MMHG | HEIGHT: 64 IN

## 2024-10-31 DIAGNOSIS — F41.1 GAD (GENERALIZED ANXIETY DISORDER): Chronic | ICD-10-CM

## 2024-10-31 DIAGNOSIS — F33.2 SEVERE EPISODE OF RECURRENT MAJOR DEPRESSIVE DISORDER, WITHOUT PSYCHOTIC FEATURES: Chronic | ICD-10-CM

## 2024-10-31 DIAGNOSIS — R44.3 HALLUCINATIONS: Chronic | ICD-10-CM

## 2024-10-31 DIAGNOSIS — F20.3 UNDIFFERENTIATED SCHIZOPHRENIA: Primary | Chronic | ICD-10-CM

## 2024-10-31 PROCEDURE — 99213 OFFICE O/P EST LOW 20 MIN: CPT | Mod: PBBFAC,PN | Performed by: NURSE PRACTITIONER

## 2024-10-31 RX ORDER — LURASIDONE HYDROCHLORIDE 80 MG/1
80 TABLET, FILM COATED ORAL DAILY
Qty: 30 TABLET | Refills: 3 | Status: SHIPPED | OUTPATIENT
Start: 2024-10-31

## 2024-10-31 RX ORDER — BUSPIRONE HYDROCHLORIDE 10 MG/1
10 TABLET ORAL 3 TIMES DAILY
Qty: 90 TABLET | Refills: 3 | Status: SHIPPED | OUTPATIENT
Start: 2024-10-31

## 2024-12-16 DIAGNOSIS — F20.3 UNDIFFERENTIATED SCHIZOPHRENIA: Chronic | ICD-10-CM

## 2024-12-16 DIAGNOSIS — F33.2 SEVERE EPISODE OF RECURRENT MAJOR DEPRESSIVE DISORDER, WITHOUT PSYCHOTIC FEATURES: Chronic | ICD-10-CM

## 2024-12-16 DIAGNOSIS — F41.1 GAD (GENERALIZED ANXIETY DISORDER): Chronic | ICD-10-CM

## 2024-12-16 DIAGNOSIS — R44.3 HALLUCINATIONS: Chronic | ICD-10-CM

## 2024-12-16 NOTE — TELEPHONE ENCOUNTER
Please see attached refill request.    Next scheduled office visit: 1/31/2025.    Last office visit: 10/31/2024.     Thank you!

## 2024-12-17 RX ORDER — BUSPIRONE HYDROCHLORIDE 10 MG/1
10 TABLET ORAL 3 TIMES DAILY
Qty: 90 TABLET | Refills: 3 | Status: SHIPPED | OUTPATIENT
Start: 2024-12-17

## 2024-12-17 RX ORDER — LURASIDONE HYDROCHLORIDE 80 MG/1
80 TABLET, FILM COATED ORAL DAILY
Qty: 30 TABLET | Refills: 3 | Status: SHIPPED | OUTPATIENT
Start: 2024-12-17

## 2025-01-30 ENCOUNTER — TELEPHONE (OUTPATIENT)
Dept: BEHAVIORAL HEALTH | Facility: CLINIC | Age: 35
End: 2025-01-30
Payer: MEDICAID

## 2025-01-30 NOTE — TELEPHONE ENCOUNTER
----- Message from Camryn Swain sent at 1/29/2025  3:04 PM CST -----  .Type:  Patient Returning Call    Who Called:Pt  Who Left Message for Patient:Pt  Does the patient know what this is regarding?:appt  Would the patient rather a call back or a response via [facility]?   Best Call Back Number:[phone]  Additional Information: Please call back pt needs to reschedule appt

## 2025-01-30 NOTE — TELEPHONE ENCOUNTER
Spoke to patient   Stated she has to reschedule tomorrow 1/31/25 @ 9:30   Car is in the shop  Will call back when she has transportation  Sola Syed CMA  1/30/2025, 10:26 AM

## 2025-02-13 ENCOUNTER — OFFICE VISIT (OUTPATIENT)
Dept: BEHAVIORAL HEALTH | Facility: CLINIC | Age: 35
End: 2025-02-13
Payer: MEDICAID

## 2025-02-13 VITALS
DIASTOLIC BLOOD PRESSURE: 77 MMHG | BODY MASS INDEX: 32.02 KG/M2 | HEIGHT: 63 IN | SYSTOLIC BLOOD PRESSURE: 111 MMHG | WEIGHT: 180.69 LBS

## 2025-02-13 DIAGNOSIS — R44.3 HALLUCINATIONS: Chronic | ICD-10-CM

## 2025-02-13 DIAGNOSIS — F20.3 UNDIFFERENTIATED SCHIZOPHRENIA: Primary | Chronic | ICD-10-CM

## 2025-02-13 DIAGNOSIS — F51.04 PSYCHOPHYSIOLOGICAL INSOMNIA: Chronic | ICD-10-CM

## 2025-02-13 DIAGNOSIS — F41.1 GAD (GENERALIZED ANXIETY DISORDER): Chronic | ICD-10-CM

## 2025-02-13 DIAGNOSIS — F33.3 SEVERE EPISODE OF RECURRENT MAJOR DEPRESSIVE DISORDER, WITH PSYCHOTIC FEATURES: ICD-10-CM

## 2025-02-13 PROCEDURE — 99213 OFFICE O/P EST LOW 20 MIN: CPT | Mod: PBBFAC,PN | Performed by: NURSE PRACTITIONER

## 2025-02-13 NOTE — PROGRESS NOTES
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 encounter (including review of prior visit notes, review of notes from other providers, review of results from laboratory/imaging studies, face-to-face time with patient, and time spent on other activities directly related to patient care): 50 minutes.    FIDENCIO Bah  [facility]

## 2025-03-13 ENCOUNTER — OFFICE VISIT (OUTPATIENT)
Dept: BEHAVIORAL HEALTH | Facility: CLINIC | Age: 35
End: 2025-03-13
Payer: MEDICAID

## 2025-03-13 VITALS
DIASTOLIC BLOOD PRESSURE: 69 MMHG | SYSTOLIC BLOOD PRESSURE: 103 MMHG | WEIGHT: 180.69 LBS | BODY MASS INDEX: 32.02 KG/M2 | HEIGHT: 63 IN

## 2025-03-13 DIAGNOSIS — F33.3 SEVERE EPISODE OF RECURRENT MAJOR DEPRESSIVE DISORDER, WITH PSYCHOTIC FEATURES: ICD-10-CM

## 2025-03-13 DIAGNOSIS — F41.1 GAD (GENERALIZED ANXIETY DISORDER): Chronic | ICD-10-CM

## 2025-03-13 DIAGNOSIS — F51.04 PSYCHOPHYSIOLOGICAL INSOMNIA: Chronic | ICD-10-CM

## 2025-03-13 DIAGNOSIS — F20.3 UNDIFFERENTIATED SCHIZOPHRENIA: Primary | Chronic | ICD-10-CM

## 2025-03-13 PROCEDURE — 99213 OFFICE O/P EST LOW 20 MIN: CPT | Mod: PBBFAC,PN | Performed by: NURSE PRACTITIONER

## 2025-03-13 RX ORDER — FLUOXETINE 10 MG/1
10 CAPSULE ORAL DAILY
Qty: 30 CAPSULE | Refills: 3 | Status: SHIPPED | OUTPATIENT
Start: 2025-03-13

## 2025-03-13 RX ORDER — QUETIAPINE FUMARATE 25 MG/1
25 TABLET, FILM COATED ORAL NIGHTLY
Qty: 30 TABLET | Refills: 3 | Status: SHIPPED | OUTPATIENT
Start: 2025-03-13

## 2025-03-13 NOTE — PROGRESS NOTES
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 hallucinations or other altered perceptions  Associations: Logical and appropriate  Fund of Knowledge: impaired to some degree  Insight: fair  Judgment: fair    Relevant Elements of Neurological Exam: no abnormal involuntary movements observed    Functioning in Relationships:  Spouse/partner: not in dating relationship  Peers: socially isolated  Employers: not working currently    Assessments:   PHQ9:        No data to display                GAD7:       3/5/2024    11:40 AM   GAD7   1. Feeling nervous, anxious, or on edge? 3   2. Not being able to stop or control worrying? 3   3. Worrying too much about different things? 3   4. Trouble relaxing? 3   5. Being so restless that it is hard to sit still? 2   6. Becoming easily annoyed or irritable? 2   7. Feeling afraid as if something awful might happen? 2   8. If you checked off any problems, how difficult have these problems made it for you to do your work, take care of things at home, or get along with other people? 1   ENOC-7 Score 18       Laboratory Data  No visits with results within 1 Month(s) from this visit.   Latest known visit with results is:   Office Visit on 01/18/2024   Component Date Value Ref Range Status    POC Preg Test, Ur 01/18/2024 Negative  Negative Final     Acceptable 01/18/2024 Yes   Final       Assessment - Diagnosis - Goals:     Lesli VASQUEZ" Balta, a 33 y.o. female, presenting for initial evaluation visit.     Impression:       ICD-10-CM ICD-9-CM   1. Severe episode of recurrent major depressive disorder, without psychotic features  F33.2 296.33   2. ENOC (generalized anxiety disorder)  F41.1 300.02   3. Psychophysiological insomnia  F51.04 307.42   4. Hallucinations  R44.3 780.1       Strengths and Liabilities: Strength: Patient is motivated for change., Strength: Patient is physically healthy., Strength: Patient has positive support network., Strength: Patient has reasonable judgment., Strength: Patient is stable., Liability: Patient is dependent., Liability: Patient has intellectual deficits., Liability: Patient lacks social skills.    Treatment Goals:  Specify outcomes written in observable, behavioral terms:   Depression: decreasing worthlessness (or other schemata-specify ), increasing energy, increasing motivation, and reducing fatigue    Treatment Plan/Recommendations:     Recommend pt establish with a psychotherapist/counselor, provided names of providers in the [facility]  Recent labwork in EMR reviewed  Ordered none  No need for PEC as pt is not an imminent danger to self or others or gravely disabled due to acute psychiatric illness  Discussed that pt should either call clinic for psychiatric crisis symptoms or present to nearest emergency room    Discussed with patient informed consent including diagnosis, risks and benefits of proposed treatment above vs. alternative treatments vs. no treatment, as well as serious and common side effects of these treatments, and the inherent unpredictability of individual responses to these treatments. The patient expresses understanding of the above and displays the capacity to agree with this current plan. Patient also agrees that, currently, the benefits outweigh the risks and would like to pursue treatment at this time, and had no other questions.    Instructions:  Take all medications as  prescribed.    Abstain from recreational drugs and alcohol.  Present to ED or call 911 for SI/HI plan or intent, psychosis, or medical emergency.    Return to Clinic: Follow up in about 8 weeks (around 4/30/2024) for medication management, face to face.    Total time:   Complexity (level) of medical decision making employed in the encounter: MODERATE    The total time for services performed on the date of the encounter (including review of prior visit notes, review of notes from other providers, review of results from laboratory/imaging studies, face-to-face time with patient, and time spent on other activities directly related to patient care): 50 minutes.    Kimberly Escobedo, CAMERONNP  [facility]

## 2025-03-13 NOTE — PATIENT INSTRUCTIONS
Plan:  1. Discontinue Caplyta  2. Continue Buspar 10mg three times a day for anxiety  3. Start Seroquel 25mg at HS  4. Start Prozac 10mg a day  5. SWLA for dentist  6. FU in 3 months

## 2025-04-08 ENCOUNTER — TELEPHONE (OUTPATIENT)
Dept: FAMILY MEDICINE | Facility: CLINIC | Age: 35
End: 2025-04-08
Payer: MEDICAID

## 2025-04-08 DIAGNOSIS — Z13.83 SCREENING FOR CARDIOVASCULAR, RESPIRATORY, AND GENITOURINARY DISEASES: ICD-10-CM

## 2025-04-08 DIAGNOSIS — Z13.29 SCREENING FOR ENDOCRINE, NUTRITIONAL, METABOLIC AND IMMUNITY DISORDER: ICD-10-CM

## 2025-04-08 DIAGNOSIS — Z13.228 SCREENING FOR ENDOCRINE, NUTRITIONAL, METABOLIC AND IMMUNITY DISORDER: ICD-10-CM

## 2025-04-08 DIAGNOSIS — Z13.21 SCREENING FOR ENDOCRINE, NUTRITIONAL, METABOLIC AND IMMUNITY DISORDER: ICD-10-CM

## 2025-04-08 DIAGNOSIS — Z00.00 WELLNESS EXAMINATION: Primary | ICD-10-CM

## 2025-04-08 DIAGNOSIS — E55.9 VITAMIN D DEFICIENCY: ICD-10-CM

## 2025-04-08 DIAGNOSIS — Z13.89 SCREENING FOR CARDIOVASCULAR, RESPIRATORY, AND GENITOURINARY DISEASES: ICD-10-CM

## 2025-04-08 DIAGNOSIS — Z13.0 SCREENING FOR ENDOCRINE, NUTRITIONAL, METABOLIC AND IMMUNITY DISORDER: ICD-10-CM

## 2025-04-08 DIAGNOSIS — Z13.6 SCREENING FOR CARDIOVASCULAR, RESPIRATORY, AND GENITOURINARY DISEASES: ICD-10-CM

## 2025-04-08 NOTE — TELEPHONE ENCOUNTER
----- Message from Adeline sent at 4/8/2025  1:36 PM CDT -----  Regarding: labs  .Caller is requesting to schedule their Lab appointment prior to annual appointment.Name of Caller:ptPreferred Date and Time of Labs:4/8/25Date of EPP Appointment:6/5/25Where would they like the lab performed?[facility]Would the patient rather a call back or a response via [facility]? Call [facility] Call Back Number:[phone] Additional Information:wellness orders

## 2025-04-10 ENCOUNTER — LAB VISIT (OUTPATIENT)
Dept: LAB | Facility: HOSPITAL | Age: 35
End: 2025-04-10
Attending: OBSTETRICS & GYNECOLOGY
Payer: MEDICAID

## 2025-04-10 DIAGNOSIS — Z13.29 SCREENING FOR ENDOCRINE, NUTRITIONAL, METABOLIC AND IMMUNITY DISORDER: ICD-10-CM

## 2025-04-10 DIAGNOSIS — Z13.21 SCREENING FOR ENDOCRINE, NUTRITIONAL, METABOLIC AND IMMUNITY DISORDER: ICD-10-CM

## 2025-04-10 DIAGNOSIS — E55.9 VITAMIN D DEFICIENCY: ICD-10-CM

## 2025-04-10 DIAGNOSIS — Z13.6 SCREENING FOR CARDIOVASCULAR, RESPIRATORY, AND GENITOURINARY DISEASES: ICD-10-CM

## 2025-04-10 DIAGNOSIS — Z13.89 SCREENING FOR CARDIOVASCULAR, RESPIRATORY, AND GENITOURINARY DISEASES: ICD-10-CM

## 2025-04-10 DIAGNOSIS — Z13.0 SCREENING FOR ENDOCRINE, NUTRITIONAL, METABOLIC AND IMMUNITY DISORDER: ICD-10-CM

## 2025-04-10 DIAGNOSIS — Z20.2 POSSIBLE EXPOSURE TO STD: ICD-10-CM

## 2025-04-10 DIAGNOSIS — Z00.00 WELLNESS EXAMINATION: ICD-10-CM

## 2025-04-10 DIAGNOSIS — Z13.83 SCREENING FOR CARDIOVASCULAR, RESPIRATORY, AND GENITOURINARY DISEASES: ICD-10-CM

## 2025-04-10 DIAGNOSIS — Z13.228 SCREENING FOR ENDOCRINE, NUTRITIONAL, METABOLIC AND IMMUNITY DISORDER: ICD-10-CM

## 2025-04-10 LAB
25(OH)D3+25(OH)D2 SERPL-MCNC: 19 NG/ML (ref 30–80)
ALBUMIN SERPL-MCNC: 3.7 G/DL (ref 3.5–5)
ALBUMIN/GLOB SERPL: 1.1 RATIO (ref 1.1–2)
ALP SERPL-CCNC: 117 UNIT/L (ref 40–150)
ALT SERPL-CCNC: 10 UNIT/L (ref 0–55)
ANION GAP SERPL CALC-SCNC: 6 MEQ/L
AST SERPL-CCNC: 14 UNIT/L (ref 11–45)
BASOPHILS # BLD AUTO: 0.06 X10(3)/MCL
BASOPHILS NFR BLD AUTO: 1.2 %
BILIRUB SERPL-MCNC: 0.5 MG/DL
BUN SERPL-MCNC: 11.2 MG/DL (ref 7–18.7)
CALCIUM SERPL-MCNC: 9.1 MG/DL (ref 8.4–10.2)
CHLORIDE SERPL-SCNC: 109 MMOL/L (ref 98–107)
CHOLEST SERPL-MCNC: 198 MG/DL
CHOLEST/HDLC SERPL: 6 {RATIO} (ref 0–5)
CO2 SERPL-SCNC: 23 MMOL/L (ref 22–29)
CREAT SERPL-MCNC: 0.76 MG/DL (ref 0.55–1.02)
CREAT/UREA NIT SERPL: 15
EOSINOPHIL # BLD AUTO: 0.09 X10(3)/MCL (ref 0–0.9)
EOSINOPHIL NFR BLD AUTO: 1.7 %
ERYTHROCYTE [DISTWIDTH] IN BLOOD BY AUTOMATED COUNT: 13.4 % (ref 11.5–17)
EST. AVERAGE GLUCOSE BLD GHB EST-MCNC: 99.7 MG/DL
GFR SERPLBLD CREATININE-BSD FMLA CKD-EPI: >60 ML/MIN/1.73/M2
GLOBULIN SER-MCNC: 3.5 GM/DL (ref 2.4–3.5)
GLUCOSE SERPL-MCNC: 88 MG/DL (ref 74–100)
HAV IGM SERPL QL IA: NONREACTIVE
HBA1C MFR BLD: 5.1 %
HBV CORE IGM SERPL QL IA: NONREACTIVE
HBV SURFACE AG SERPL QL IA: NONREACTIVE
HCT VFR BLD AUTO: 40.8 % (ref 37–47)
HCV AB SERPL QL IA: NONREACTIVE
HDLC SERPL-MCNC: 31 MG/DL (ref 35–60)
HGB BLD-MCNC: 13.8 G/DL (ref 12–16)
HIV 1+2 AB+HIV1 P24 AG SERPL QL IA: NONREACTIVE
IMM GRANULOCYTES # BLD AUTO: 0 X10(3)/MCL (ref 0–0.04)
IMM GRANULOCYTES NFR BLD AUTO: 0 %
LDLC SERPL CALC-MCNC: 155 MG/DL (ref 50–140)
LYMPHOCYTES # BLD AUTO: 1.92 X10(3)/MCL (ref 0.6–4.6)
LYMPHOCYTES NFR BLD AUTO: 37.2 %
MCH RBC QN AUTO: 29.2 PG (ref 27–31)
MCHC RBC AUTO-ENTMCNC: 33.8 G/DL (ref 33–36)
MCV RBC AUTO: 86.4 FL (ref 80–94)
MONOCYTES # BLD AUTO: 0.32 X10(3)/MCL (ref 0.1–1.3)
MONOCYTES NFR BLD AUTO: 6.2 %
NEUTROPHILS # BLD AUTO: 2.77 X10(3)/MCL (ref 2.1–9.2)
NEUTROPHILS NFR BLD AUTO: 53.7 %
PLATELET # BLD AUTO: 246 X10(3)/MCL (ref 130–400)
PMV BLD AUTO: 9.4 FL (ref 7.4–10.4)
POTASSIUM SERPL-SCNC: 4.1 MMOL/L (ref 3.5–5.1)
PROT SERPL-MCNC: 7.2 GM/DL (ref 6.4–8.3)
RBC # BLD AUTO: 4.72 X10(6)/MCL (ref 4.2–5.4)
SODIUM SERPL-SCNC: 138 MMOL/L (ref 136–145)
TRIGL SERPL-MCNC: 59 MG/DL (ref 37–140)
TSH SERPL-ACNC: 2.01 UIU/ML (ref 0.35–4.94)
VLDLC SERPL CALC-MCNC: 12 MG/DL
WBC # BLD AUTO: 5.16 X10(3)/MCL (ref 4.5–11.5)

## 2025-04-10 PROCEDURE — 84443 ASSAY THYROID STIM HORMONE: CPT

## 2025-04-10 PROCEDURE — 80074 ACUTE HEPATITIS PANEL: CPT

## 2025-04-10 PROCEDURE — 83036 HEMOGLOBIN GLYCOSYLATED A1C: CPT

## 2025-04-10 PROCEDURE — 80053 COMPREHEN METABOLIC PANEL: CPT

## 2025-04-10 PROCEDURE — 80061 LIPID PANEL: CPT

## 2025-04-10 PROCEDURE — 36415 COLL VENOUS BLD VENIPUNCTURE: CPT

## 2025-04-10 PROCEDURE — 87389 HIV-1 AG W/HIV-1&-2 AB AG IA: CPT

## 2025-04-10 PROCEDURE — 86780 TREPONEMA PALLIDUM: CPT

## 2025-04-10 PROCEDURE — 85025 COMPLETE CBC W/AUTO DIFF WBC: CPT

## 2025-04-10 PROCEDURE — 82306 VITAMIN D 25 HYDROXY: CPT

## 2025-04-12 LAB — T PALLIDUM AB SER QL AGGL: POSITIVE

## 2025-04-13 LAB — PATH REV: NORMAL

## 2025-05-20 ENCOUNTER — OFFICE VISIT (OUTPATIENT)
Dept: BEHAVIORAL HEALTH | Facility: CLINIC | Age: 35
End: 2025-05-20
Payer: MEDICAID

## 2025-05-20 VITALS
DIASTOLIC BLOOD PRESSURE: 80 MMHG | HEIGHT: 64 IN | WEIGHT: 182.63 LBS | SYSTOLIC BLOOD PRESSURE: 126 MMHG | BODY MASS INDEX: 31.18 KG/M2

## 2025-05-20 DIAGNOSIS — F20.3 UNDIFFERENTIATED SCHIZOPHRENIA: Primary | Chronic | ICD-10-CM

## 2025-05-20 DIAGNOSIS — F41.1 GAD (GENERALIZED ANXIETY DISORDER): Chronic | ICD-10-CM

## 2025-05-20 DIAGNOSIS — F33.3 SEVERE EPISODE OF RECURRENT MAJOR DEPRESSIVE DISORDER, WITH PSYCHOTIC FEATURES: ICD-10-CM

## 2025-05-20 DIAGNOSIS — R44.3 HALLUCINATIONS: Chronic | ICD-10-CM

## 2025-05-20 DIAGNOSIS — F51.04 PSYCHOPHYSIOLOGICAL INSOMNIA: Chronic | ICD-10-CM

## 2025-05-20 PROCEDURE — 3008F BODY MASS INDEX DOCD: CPT | Mod: CPTII,,, | Performed by: NURSE PRACTITIONER

## 2025-05-20 PROCEDURE — 3079F DIAST BP 80-89 MM HG: CPT | Mod: CPTII,,, | Performed by: NURSE PRACTITIONER

## 2025-05-20 PROCEDURE — 1160F RVW MEDS BY RX/DR IN RCRD: CPT | Mod: CPTII,,, | Performed by: NURSE PRACTITIONER

## 2025-05-20 PROCEDURE — 99213 OFFICE O/P EST LOW 20 MIN: CPT | Mod: PBBFAC,PN | Performed by: NURSE PRACTITIONER

## 2025-05-20 PROCEDURE — 1159F MED LIST DOCD IN RCRD: CPT | Mod: CPTII,,, | Performed by: NURSE PRACTITIONER

## 2025-05-20 PROCEDURE — 99214 OFFICE O/P EST MOD 30 MIN: CPT | Mod: S$PBB,HB,SA, | Performed by: NURSE PRACTITIONER

## 2025-05-20 PROCEDURE — 3044F HG A1C LEVEL LT 7.0%: CPT | Mod: CPTII,,, | Performed by: NURSE PRACTITIONER

## 2025-05-20 PROCEDURE — 3074F SYST BP LT 130 MM HG: CPT | Mod: CPTII,,, | Performed by: NURSE PRACTITIONER

## 2025-05-20 RX ORDER — QUETIAPINE FUMARATE 25 MG/1
50 TABLET, FILM COATED ORAL NIGHTLY
Qty: 60 TABLET | Refills: 3 | Status: SHIPPED | OUTPATIENT
Start: 2025-05-20
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
## 2025-06-05 ENCOUNTER — OFFICE VISIT (OUTPATIENT)
Dept: FAMILY MEDICINE | Facility: CLINIC | Age: 35
End: 2025-06-05
Payer: MEDICAID

## 2025-06-05 VITALS
BODY MASS INDEX: 32.04 KG/M2 | SYSTOLIC BLOOD PRESSURE: 115 MMHG | RESPIRATION RATE: 18 BRPM | HEART RATE: 92 BPM | DIASTOLIC BLOOD PRESSURE: 79 MMHG | WEIGHT: 180.81 LBS | TEMPERATURE: 98 F | OXYGEN SATURATION: 95 % | HEIGHT: 63 IN

## 2025-06-05 DIAGNOSIS — F20.3 UNDIFFERENTIATED SCHIZOPHRENIA: Chronic | ICD-10-CM

## 2025-06-05 DIAGNOSIS — G43.E09 CHRONIC MIGRAINE WITH AURA WITHOUT STATUS MIGRAINOSUS, NOT INTRACTABLE: Primary | ICD-10-CM

## 2025-06-05 DIAGNOSIS — G43.E09 CHRONIC MIGRAINE WITH AURA WITHOUT STATUS MIGRAINOSUS, NOT INTRACTABLE: ICD-10-CM

## 2025-06-05 RX ORDER — PROPRANOLOL HYDROCHLORIDE 10 MG/1
10 TABLET ORAL 2 TIMES DAILY
Qty: 180 TABLET | Refills: 0 | Status: SHIPPED | OUTPATIENT
Start: 2025-06-05

## 2025-06-05 RX ORDER — PROPRANOLOL HYDROCHLORIDE 10 MG/1
10 TABLET ORAL 2 TIMES DAILY
Qty: 60 TABLET | Refills: 1 | Status: SHIPPED | OUTPATIENT
Start: 2025-06-05 | End: 2025-06-05

## 2025-06-17 ENCOUNTER — HOSPITAL ENCOUNTER (OUTPATIENT)
Dept: RADIOLOGY | Facility: HOSPITAL | Age: 35
Discharge: HOME | End: 2025-06-17
Attending: NURSE PRACTITIONER
Payer: MEDICAID

## 2025-06-17 DIAGNOSIS — G43.E09 CHRONIC MIGRAINE WITH AURA WITHOUT STATUS MIGRAINOSUS, NOT INTRACTABLE: ICD-10-CM

## 2025-06-17 PROCEDURE — 70551 MRI BRAIN STEM W/O DYE: CPT | Mod: TC

## 2025-06-26 ENCOUNTER — OFFICE VISIT (OUTPATIENT)
Dept: FAMILY MEDICINE | Facility: CLINIC | Age: 35
End: 2025-06-26
Payer: MEDICAID

## 2025-06-26 VITALS
BODY MASS INDEX: 32.46 KG/M2 | HEIGHT: 63 IN | SYSTOLIC BLOOD PRESSURE: 102 MMHG | HEART RATE: 82 BPM | OXYGEN SATURATION: 98 % | DIASTOLIC BLOOD PRESSURE: 71 MMHG | TEMPERATURE: 98 F | WEIGHT: 183.19 LBS | RESPIRATION RATE: 18 BRPM

## 2025-06-26 DIAGNOSIS — R51.9 ACUTE NONINTRACTABLE HEADACHE, UNSPECIFIED HEADACHE TYPE: Primary | ICD-10-CM

## 2025-06-26 DIAGNOSIS — R51.9 ACUTE NONINTRACTABLE HEADACHE, UNSPECIFIED HEADACHE TYPE: ICD-10-CM

## 2025-06-26 RX ORDER — TOPIRAMATE 25 MG/1
25 TABLET, FILM COATED ORAL 2 TIMES DAILY
Qty: 180 TABLET | Refills: 0 | Status: SHIPPED | OUTPATIENT
Start: 2025-06-26

## 2025-06-26 RX ORDER — TOPIRAMATE 25 MG/1
25 TABLET, FILM COATED ORAL 2 TIMES DAILY
Qty: 60 TABLET | Refills: 1 | Status: SHIPPED | OUTPATIENT
Start: 2025-06-26 | End: 2025-06-26

## 2025-06-26 NOTE — PROGRESS NOTES
"SUBJECTIVE:     History of Present Illness      Chief Complaint: Follow-up (3 wk FU)    HPI:  Patient is a 35 y.o. year old female who presents to clinic for three-week follow-up for headaches.  Patient reports headaches or stabbing almost daily.  Laying down does help.  Headaches are worse when standing.  MRI no intracranial changing.  Patient reports headaches or consistent.  She reportst she was unable to get propranolol due to insurance.    Review of Systems:    Review of Systems    12 point review of systems conducted, negative except as stated in the history of present illness. See HPI for details.     Previous History      PCP: Muriel Benson FNP  Review of patient's allergies indicates:   Allergen Reactions    Ibuprofen Hives    Shellfish containing products      Other Reaction(s): Unknown    Tramadol      Other Reaction(s): Unknown       Past Medical History:   Diagnosis Date    Anxiety     Depression 09/01/2023    Schizophrenia        Past Surgical History:   Procedure Laterality Date    CYST REMOVAL      behind right ear    TUBAL LIGATION       Family History   Problem Relation Name Age of Onset    Hypertension Mother      Hypertension Father         Social History[1]     Health Maintenance      Health Maintenance   Topic Date Due    COVID-19 Vaccine (1 - 2024-25 season) Never done    Influenza Vaccine (Season Ended) 09/01/2025    Hemoglobin A1c (Diabetic Prevention Screening)  04/10/2028    Cervical Cancer Screening  05/02/2028    TETANUS VACCINE  06/04/2031    RSV Vaccine (Age 60+ and Pregnant patients) (1 - 1-dose 75+ series) 03/09/2064    Hepatitis C Screening  Completed    HIV Screening  Completed    Lipid Panel  Completed    Pneumococcal Vaccines (Age 0-49)  Aged Out       OBJECTIVE:     Physical Exam      Vital Signs Reviewed   Visit Vitals  /71 (BP Location: Left arm, Patient Position: Sitting)   Pulse 82   Temp 98.4 °F (36.9 °C) (Oral)   Resp 18   Ht 5' 3" (1.6 m)   Wt 83.1 kg (183 lb " 3.2 oz)   SpO2 98%   BMI 32.45 kg/m²       Physical Exam    Physical Exam:  General: Alert, well nourished, no acute distress, non-toxic appearing.   Eyes: Anicteric sclera, without conjunctival injection, normal lids, no purulent drainage, EOMs grossly intact.   Ears: No tragal tenderness. Tympanic membranes intact, pearly grey, without effusion or erythema and with a positive light reflex.   Mouth: Posterior pharynx without erythema. No exudate, ulcerations, or lesion. No tonsillar swelling.   Neck: Supple, full ROM, no rigidity, no cervical adenopathy.   Cardio: Normal rate and rhythm    Resp: Respirations even and unlabored, clear to auscultation bilaterally.   Abd: No ecchymosis or distension. Normal bowel sounds in all 4 quadrants. No tenderness to palpation. No rebound tenderness or guarding. No CVA tenderness.   Skin: No rashes or open lesions noted.   MSK: No swelling. No abrasions or signs of trauma. Ambulating without assistance.   Neuro: Alert,oriented No focal deficits noted. Facial expressions even.   Psych: Cooperative, Normal affect      Labs   Chemistry:  Lab Results   Component Value Date     04/10/2025    K 4.1 04/10/2025    BUN 11.2 04/10/2025    CREATININE 0.76 04/10/2025    EGFRNORACEVR >60 04/10/2025    CALCIUM 9.1 04/10/2025    ALKPHOS 117 04/10/2025    ALBUMIN 3.7 04/10/2025    BILIDIR 0.00 08/01/2018    IBILI 0.10 08/01/2018    AST 14 04/10/2025    ALT 10 04/10/2025    MG 1.90 04/13/2023    [id number] 19 (L) 04/10/2025    TSH 2.011 04/10/2025        Lab Results   Component Value Date    HGBA1C 5.1 04/10/2025        Hematology:  Lab Results   Component Value Date    WBC 5.16 04/10/2025    HGB 13.8 04/10/2025    HCT 40.8 04/10/2025     04/10/2025       Lipid Panel:  Lab Results   Component Value Date    CHOL 198 04/10/2025    HDL 31 (L) 04/10/2025    .00 (H) 04/10/2025    TRIG 59 04/10/2025    TOTALCHOLEST 6 (H) 04/10/2025        Urine:  Lab Results   Component Value  Date    APPEARANCEUA Clear 05/29/2023    SGUA 1.025 05/29/2023    PROTEINUA Negative 05/29/2023    KETONESUA Negative 05/29/2023    LEUKOCYTESUR Negative 05/29/2023    RBCUA 6-10 (A) 05/29/2023    WBCUA None Seen 05/29/2023    BACTERIA Few (A) 05/29/2023         Assessment         ICD-10-CM ICD-9-CM   1. Acute nonintractable headache, unspecified headache type  R51.9 784.0       Plan       Assessment & Plan  Acute nonintractable headache, unspecified headache type  Start Topamax    Ed precautions discussed     Orders:    E-Consult to General Neurology       Orders Placed This Encounter    E-Consult to General Neurology      Medication List with Changes/Refills   New Medications    TOPIRAMATE (TOPAMAX) 25 MG TABLET    TAKE 1 TABLET(25 MG) BY MOUTH TWICE DAILY   Current Medications    BUSPIRONE (BUSPAR) 10 MG TABLET    Take 1 tablet (10 mg total) by mouth 3 (three) times daily.    CETIRIZINE (ZYRTEC) 10 MG TABLET    Take 10 mg by mouth once daily.    FLUOXETINE 10 MG CAPSULE    Take 1 capsule (10 mg total) by mouth once daily.    FLUTICASONE PROPIONATE (FLONASE) 50 MCG/ACTUATION NASAL SPRAY    1 spray by Each Nostril route Daily.    MEDROXYPROGESTERONE (DEPO-PROVERA) 150 MG/ML SYRG    Inject 1 mL (150 mg total) into the muscle every 3 (three) months.    MEDROXYPROGESTERONE (DEPO-PROVERA) 150 MG/ML SYRG    Inject 1 mL (150 mg total) into the muscle every 3 (three) months.    QUETIAPINE (SEROQUEL) 25 MG TAB    Take 2 tablets (50 mg total) by mouth nightly.    TRIAMCINOLONE ACETONIDE 0.5% (KENALOG) 0.5 % CREA    Apply topically 2 (two) times daily.   Discontinued Medications    PROPRANOLOL (INDERAL) 10 MG TABLET    TAKE 1 TABLET(10 MG) BY MOUTH TWICE DAILY         Follow up in about 3 weeks (around 7/17/2025), or if symptoms worsen or fail to improve, for headache . In addition to their scheduled follow up, the patient has also been instructed to follow up on as needed basis.   Future Appointments   Date Time Provider  Department Center   7/10/2025  9:00 AM [facility]even [facility]udio [facility]   7/17/2025 11:15 AM Muriel Benson FNP [facility]   8/20/2025 10:00 AM Kimberly Escobedo APRN [facility]   9/15/2025  7:00 AM Adeline Escobedo FNP [facility]   6/9/2026 10:00 AM Muriel Benson FNP [facility]       KALPANA Ghotra                [1]   Social History  Tobacco Use    Smoking status: Never     Passive exposure: Never    Smokeless tobacco: Never   Substance Use Topics    Alcohol use: Not Currently    Drug use: Never

## 2025-06-27 ENCOUNTER — E-CONSULT (OUTPATIENT)
Dept: NEUROLOGY | Facility: HOSPITAL | Age: 35
End: 2025-06-27
Payer: MEDICAID

## 2025-06-27 DIAGNOSIS — R51.9 CHRONIC NONINTRACTABLE HEADACHE, UNSPECIFIED HEADACHE TYPE: Primary | ICD-10-CM

## 2025-06-27 DIAGNOSIS — G89.29 CHRONIC NONINTRACTABLE HEADACHE, UNSPECIFIED HEADACHE TYPE: Primary | ICD-10-CM

## 2025-06-27 NOTE — CONSULTS
[facility] NEUROLOGY  Response for E-Consult     Patient Name: Lesli Gifford  MRN: [id number]  Primary Care Provider: Muriel Benson FNP   Requesting Provider: Muriel Benson FNP  Consults    Recommendation:     Good morning    -OK to start topiramate trial. Caution if pt has Hx of kidney stones.  -Any visual disturbances? Ophthalmology evaluation in case of suspected IIH.    Additional future steps to consider:     Total time of Consultation: 15 minute    I did not speak to the requesting provider verbally about this.     *This eConsult is based on the clinical data available to me and is furnished without benefit of a physical examination. The eConsult will need to be interpreted in light of any clinical issues or changes in patient status not available to me at the time of filing this eConsults. Significant changes in patient condition or level of acuity should result in immediate formal consultation and reevaluation. Please alert me if you have further questions.    Thank you for this eConsult referral.     Musa Vera MD  [facility] NEUROLOGY

## 2025-07-17 ENCOUNTER — OFFICE VISIT (OUTPATIENT)
Dept: FAMILY MEDICINE | Facility: CLINIC | Age: 35
End: 2025-07-17
Payer: MEDICAID

## 2025-07-17 VITALS
HEIGHT: 63 IN | BODY MASS INDEX: 32.84 KG/M2 | SYSTOLIC BLOOD PRESSURE: 100 MMHG | RESPIRATION RATE: 18 BRPM | DIASTOLIC BLOOD PRESSURE: 70 MMHG | OXYGEN SATURATION: 96 % | TEMPERATURE: 98 F | WEIGHT: 185.31 LBS | HEART RATE: 85 BPM

## 2025-07-17 DIAGNOSIS — R51.9 ACUTE NONINTRACTABLE HEADACHE, UNSPECIFIED HEADACHE TYPE: Primary | ICD-10-CM

## 2025-07-17 PROCEDURE — 99214 OFFICE O/P EST MOD 30 MIN: CPT | Mod: ,,, | Performed by: NURSE PRACTITIONER

## 2025-07-17 PROCEDURE — 1159F MED LIST DOCD IN RCRD: CPT | Mod: CPTII,,, | Performed by: NURSE PRACTITIONER

## 2025-07-17 PROCEDURE — 1160F RVW MEDS BY RX/DR IN RCRD: CPT | Mod: CPTII,,, | Performed by: NURSE PRACTITIONER

## 2025-07-17 PROCEDURE — 3008F BODY MASS INDEX DOCD: CPT | Mod: CPTII,,, | Performed by: NURSE PRACTITIONER

## 2025-07-17 PROCEDURE — 3044F HG A1C LEVEL LT 7.0%: CPT | Mod: CPTII,,, | Performed by: NURSE PRACTITIONER

## 2025-07-17 PROCEDURE — 3074F SYST BP LT 130 MM HG: CPT | Mod: CPTII,,, | Performed by: NURSE PRACTITIONER

## 2025-07-17 PROCEDURE — 3078F DIAST BP <80 MM HG: CPT | Mod: CPTII,,, | Performed by: NURSE PRACTITIONER

## 2025-07-17 NOTE — PROGRESS NOTES
"  SUBJECTIVE:     History of Present Illness      Chief Complaint: Follow-up and Headache (Medication for headache has been helping.)    HPI:  Patient is a 35 y.o. year old female who presents to clinic for headache follow up.  Patient description headache is very vague she reports today her headache is a 10/10.  She reports overall the Topamax has been working, but she  had a "bad Headache" yesterday.     Review of Systems:    Review of Systems    12 point review of systems conducted, negative except as stated in the history of present illness. See HPI for details.     Previous History      PCP: Muriel Benson FNP  Review of patient's allergies indicates:   Allergen Reactions    Ibuprofen Hives    Shellfish containing products      Other Reaction(s): Unknown    Tramadol      Other Reaction(s): Unknown       Past Medical History:   Diagnosis Date    Anxiety     Depression 09/01/2023    Schizophrenia        Past Surgical History:   Procedure Laterality Date    CYST REMOVAL      behind right ear    TUBAL LIGATION       Family History   Problem Relation Name Age of Onset    Hypertension Mother      Hypertension Father         Social History[1]     Health Maintenance      Health Maintenance   Topic Date Due    COVID-19 Vaccine (1 - 2024-25 season) Never done    Influenza Vaccine (1) 09/01/2025    Hemoglobin A1c (Diabetic Prevention Screening)  04/10/2028    Cervical Cancer Screening  05/02/2028    TETANUS VACCINE  06/04/2031    RSV Vaccine (Age 60+ and Pregnant patients) (1 - 1-dose 75+ series) 03/09/2064    Hepatitis C Screening  Completed    HIV Screening  Completed    Lipid Panel  Completed    Pneumococcal Vaccines (Age 0-49)  Aged Out       OBJECTIVE:     Physical Exam      Vital Signs Reviewed   Visit Vitals  /70 (BP Location: Left arm, Patient Position: Sitting)   Pulse 85   Temp 98.3 °F (36.8 °C) (Oral)   Resp 18   Ht 5' 3" (1.6 m)   Wt 84.1 kg (185 lb 4.8 oz)   LMP 06/25/2025 (Approximate)   SpO2 96% "   BMI 32.82 kg/m²       Physical Exam    Physical Exam:  General: Alert, well nourished, no acute distress, non-toxic appearing.   Eyes: Anicteric sclera, without conjunctival injection, normal lids, no purulent drainage, EOMs grossly intact.   Ears: No tragal tenderness. Tympanic membranes intact, pearly grey, without effusion or erythema and with a positive light reflex.   Mouth: Posterior pharynx without erythema. No exudate, ulcerations, or lesion. No tonsillar swelling.   Neck: Supple, full ROM, no rigidity, no cervical adenopathy.   Cardio: Normal rate and rhythm    Resp: Respirations even and unlabored, clear to auscultation bilaterally.   Abd: No ecchymosis or distension. Normal bowel sounds in all 4 quadrants. No tenderness to palpation. No rebound tenderness or guarding. No CVA tenderness.   Skin: No rashes or open lesions noted.   MSK: No swelling. No abrasions or signs of trauma. Ambulating without assistance.   Neuro: Alert,oriented No focal deficits noted. Facial expressions even.   Psych: Cooperative, Normal affect      Labs   Chemistry:  Lab Results   Component Value Date     04/10/2025    K 4.1 04/10/2025    BUN 11.2 04/10/2025    CREATININE 0.76 04/10/2025    EGFRNORACEVR >60 04/10/2025    CALCIUM 9.1 04/10/2025    ALKPHOS 117 04/10/2025    ALBUMIN 3.7 04/10/2025    BILIDIR 0.00 08/01/2018    IBILI 0.10 08/01/2018    AST 14 04/10/2025    ALT 10 04/10/2025    MG 1.90 04/13/2023    [id number] 19 (L) 04/10/2025    TSH 2.011 04/10/2025        Lab Results   Component Value Date    HGBA1C 5.1 04/10/2025        Hematology:  Lab Results   Component Value Date    WBC 5.16 04/10/2025    HGB 13.8 04/10/2025    HCT 40.8 04/10/2025     04/10/2025       Lipid Panel:  Lab Results   Component Value Date    CHOL 198 04/10/2025    HDL 31 (L) 04/10/2025    .00 (H) 04/10/2025    TRIG 59 04/10/2025    TOTALCHOLEST 6 (H) 04/10/2025        Urine:  Lab Results   Component Value Date    APPEARANCEUA  Clear 05/29/2023    SGUA 1.025 05/29/2023    PROTEINUA Negative 05/29/2023    KETONESUA Negative 05/29/2023    LEUKOCYTESUR Negative 05/29/2023    RBCUA 6-10 (A) 05/29/2023    WBCUA None Seen 05/29/2023    BACTERIA Few (A) 05/29/2023         Assessment         ICD-10-CM ICD-9-CM   1. Acute nonintractable headache, unspecified headache type  R51.9 784.0       Plan       Assessment & Plan  Acute nonintractable headache, unspecified headache type  Increase topamax      Orders:    Ambulatory referral/consult to Neurology; Future    topiramate (TOPAMAX) 50 MG tablet; Take 1 tablet (50 mg total) by mouth 2 (two) times daily.       Orders Placed This Encounter    Ambulatory referral/consult to Neurology    topiramate (TOPAMAX) 50 MG tablet      Medication List with Changes/Refills   Current Medications    BUSPIRONE (BUSPAR) 10 MG TABLET    Take 1 tablet (10 mg total) by mouth 3 (three) times daily.    CETIRIZINE (ZYRTEC) 10 MG TABLET    Take 10 mg by mouth once daily.    FLUOXETINE 10 MG CAPSULE    Take 1 capsule (10 mg total) by mouth once daily.    FLUTICASONE PROPIONATE (FLONASE) 50 MCG/ACTUATION NASAL SPRAY    1 spray by Each Nostril route Daily.    QUETIAPINE (SEROQUEL) 25 MG TAB    Take 2 tablets (50 mg total) by mouth nightly.    TRIAMCINOLONE ACETONIDE 0.5% (KENALOG) 0.5 % CREA    Apply topically 2 (two) times daily.   Changed and/or Refilled Medications    Modified Medication Previous Medication    TOPIRAMATE (TOPAMAX) 50 MG TABLET topiramate (TOPAMAX) 25 MG tablet       Take 1 tablet (50 mg total) by mouth 2 (two) times daily.    TAKE 1 TABLET(25 MG) BY MOUTH TWICE DAILY         Follow up in about 6 weeks (around 8/28/2025) for headache. In addition to their scheduled follow up, the patient has also been instructed to follow up on as needed basis.   Future Appointments   Date Time Provider Department Center   8/20/2025 10:00 AM [facility]   9/15/2025  7:00 AM Adeline Escobedo  [facility]   10/8/2025  9:00 AM Keven Zamudio MD [facility]   6/9/2026 10:00 AM Muriel Benson FNP [facility]       KALPANA Ghotra                [1]   Social History  Tobacco Use    Smoking status: Never     Passive exposure: Never    Smokeless tobacco: Never   Substance Use Topics    Alcohol use: Not Currently    Drug use: Never

## 2025-07-21 RX ORDER — TOPIRAMATE 50 MG/1
50 TABLET, FILM COATED ORAL 2 TIMES DAILY
Qty: 60 TABLET | Refills: 3 | Status: SHIPPED | OUTPATIENT
Start: 2025-07-21

## 2025-08-18 ENCOUNTER — TELEPHONE (OUTPATIENT)
Dept: RADIOLOGY | Facility: HOSPITAL | Age: 35
End: 2025-08-18
Payer: MEDICAID

## 2025-08-20 ENCOUNTER — OFFICE VISIT (OUTPATIENT)
Dept: BEHAVIORAL HEALTH | Facility: CLINIC | Age: 35
End: 2025-08-20
Payer: MEDICAID

## 2025-08-20 VITALS
DIASTOLIC BLOOD PRESSURE: 70 MMHG | WEIGHT: 181.31 LBS | HEIGHT: 63 IN | SYSTOLIC BLOOD PRESSURE: 109 MMHG | BODY MASS INDEX: 32.12 KG/M2

## 2025-08-20 DIAGNOSIS — F41.1 GAD (GENERALIZED ANXIETY DISORDER): Chronic | ICD-10-CM

## 2025-08-20 DIAGNOSIS — F51.04 PSYCHOPHYSIOLOGICAL INSOMNIA: Chronic | ICD-10-CM

## 2025-08-20 DIAGNOSIS — F33.3 SEVERE EPISODE OF RECURRENT MAJOR DEPRESSIVE DISORDER, WITH PSYCHOTIC FEATURES: ICD-10-CM

## 2025-08-20 DIAGNOSIS — F20.3 UNDIFFERENTIATED SCHIZOPHRENIA: Chronic | ICD-10-CM

## 2025-08-20 PROCEDURE — 99213 OFFICE O/P EST LOW 20 MIN: CPT | Mod: PBBFAC,PN | Performed by: NURSE PRACTITIONER

## 2025-08-20 RX ORDER — FLUOXETINE 10 MG/1
10 CAPSULE ORAL DAILY
Qty: 90 CAPSULE | Refills: 1 | Status: SHIPPED | OUTPATIENT
Start: 2025-08-20

## 2025-08-20 RX ORDER — QUETIAPINE FUMARATE 50 MG/1
50 TABLET, FILM COATED ORAL NIGHTLY
Qty: 90 TABLET | Refills: 1 | Status: SHIPPED | OUTPATIENT
Start: 2025-08-20

## 2025-08-20 RX ORDER — BUSPIRONE HYDROCHLORIDE 10 MG/1
10 TABLET ORAL 3 TIMES DAILY
Qty: 270 TABLET | Refills: 1 | Status: SHIPPED | OUTPATIENT
Start: 2025-08-20